# Patient Record
Sex: MALE | Race: BLACK OR AFRICAN AMERICAN | Employment: UNEMPLOYED | URBAN - METROPOLITAN AREA
[De-identification: names, ages, dates, MRNs, and addresses within clinical notes are randomized per-mention and may not be internally consistent; named-entity substitution may affect disease eponyms.]

---

## 2017-11-16 DIAGNOSIS — I50.9 CHRONIC CONGESTIVE HEART FAILURE, UNSPECIFIED CONGESTIVE HEART FAILURE TYPE: ICD-10-CM

## 2017-11-16 DIAGNOSIS — R79.89 ABNORMAL CBC: ICD-10-CM

## 2017-11-16 DIAGNOSIS — I10 ESSENTIAL HYPERTENSION: ICD-10-CM

## 2017-11-16 LAB
ALBUMIN SERPL-MCNC: 4.3 G/DL (ref 3.9–4.9)
ALP BLD-CCNC: 60 U/L (ref 35–104)
ALT SERPL-CCNC: 9 U/L (ref 0–41)
ANION GAP SERPL CALCULATED.3IONS-SCNC: 14 MEQ/L (ref 7–13)
AST SERPL-CCNC: 17 U/L (ref 0–40)
BILIRUB SERPL-MCNC: 0.4 MG/DL (ref 0–1.2)
BUN BLDV-MCNC: 16 MG/DL (ref 6–20)
CALCIUM SERPL-MCNC: 9.7 MG/DL (ref 8.6–10.2)
CHLORIDE BLD-SCNC: 97 MEQ/L (ref 98–107)
CHOLESTEROL, TOTAL: 164 MG/DL (ref 0–199)
CO2: 31 MEQ/L (ref 22–29)
CREAT SERPL-MCNC: 1.64 MG/DL (ref 0.7–1.2)
GFR AFRICAN AMERICAN: 56.7
GFR NON-AFRICAN AMERICAN: 46.9
GLOBULIN: 3 G/DL (ref 2.3–3.5)
GLUCOSE BLD-MCNC: 60 MG/DL (ref 74–109)
HCT VFR BLD CALC: 42.9 % (ref 42–52)
HDLC SERPL-MCNC: 47 MG/DL (ref 40–59)
HEMOGLOBIN: 13.5 G/DL (ref 14–18)
LDL CHOLESTEROL CALCULATED: 99 MG/DL (ref 0–129)
MCH RBC QN AUTO: 26.4 PG (ref 27–31.3)
MCHC RBC AUTO-ENTMCNC: 31.3 % (ref 33–37)
MCV RBC AUTO: 84.4 FL (ref 80–100)
PDW BLD-RTO: 20.1 % (ref 11.5–14.5)
PLATELET # BLD: 249 K/UL (ref 130–400)
POTASSIUM SERPL-SCNC: 5.1 MEQ/L (ref 3.5–5.1)
RBC # BLD: 5.09 M/UL (ref 4.7–6.1)
SODIUM BLD-SCNC: 142 MEQ/L (ref 132–144)
TOTAL PROTEIN: 7.3 G/DL (ref 6.4–8.1)
TRIGL SERPL-MCNC: 92 MG/DL (ref 0–200)
WBC # BLD: 6.1 K/UL (ref 4.8–10.8)

## 2018-06-20 ENCOUNTER — HOSPITAL ENCOUNTER (INPATIENT)
Age: 38
LOS: 12 days | Discharge: HOME | DRG: 750 | End: 2018-07-02
Attending: EMERGENCY MEDICINE | Admitting: PSYCHIATRY & NEUROLOGY
Payer: COMMERCIAL

## 2018-06-20 DIAGNOSIS — I82.401 ACUTE DEEP VEIN THROMBOSIS (DVT) OF RIGHT LOWER EXTREMITY, UNSPECIFIED VEIN (HCC): ICD-10-CM

## 2018-06-20 DIAGNOSIS — I50.9 CHRONIC CONGESTIVE HEART FAILURE, UNSPECIFIED CONGESTIVE HEART FAILURE TYPE: ICD-10-CM

## 2018-06-20 DIAGNOSIS — F25.9 SCHIZOAFFECTIVE DISORDER, UNSPECIFIED TYPE (HCC): Primary | ICD-10-CM

## 2018-06-20 LAB
ALBUMIN SERPL-MCNC: 4.1 G/DL (ref 3.9–4.9)
ALP BLD-CCNC: 64 U/L (ref 35–104)
ALT SERPL-CCNC: 8 U/L (ref 0–41)
AMPHETAMINE SCREEN, URINE: NORMAL
ANION GAP SERPL CALCULATED.3IONS-SCNC: 15 MEQ/L (ref 7–13)
AST SERPL-CCNC: 13 U/L (ref 0–40)
BARBITURATE SCREEN URINE: NORMAL
BASOPHILS ABSOLUTE: 0 K/UL (ref 0–0.2)
BASOPHILS RELATIVE PERCENT: 0.6 %
BENZODIAZEPINE SCREEN, URINE: NORMAL
BILIRUB SERPL-MCNC: 0.4 MG/DL (ref 0–1.2)
BILIRUBIN URINE: NEGATIVE
BLOOD, URINE: NEGATIVE
BUN BLDV-MCNC: 16 MG/DL (ref 6–20)
CALCIUM SERPL-MCNC: 9.4 MG/DL (ref 8.6–10.2)
CANNABINOID SCREEN URINE: NORMAL
CHLORIDE BLD-SCNC: 99 MEQ/L (ref 98–107)
CLARITY: CLEAR
CO2: 24 MEQ/L (ref 22–29)
COCAINE METABOLITE SCREEN URINE: NORMAL
COLOR: YELLOW
CREAT SERPL-MCNC: 1.56 MG/DL (ref 0.7–1.2)
EOSINOPHILS ABSOLUTE: 0 K/UL (ref 0–0.7)
EOSINOPHILS RELATIVE PERCENT: 0.3 %
ETHANOL PERCENT: NORMAL G/DL
ETHANOL: <10 MG/DL (ref 0–0.08)
GFR AFRICAN AMERICAN: 59.9
GFR NON-AFRICAN AMERICAN: 49.5
GLOBULIN: 3.4 G/DL (ref 2.3–3.5)
GLUCOSE BLD-MCNC: 128 MG/DL (ref 74–109)
GLUCOSE URINE: NEGATIVE MG/DL
HCT VFR BLD CALC: 49.7 % (ref 42–52)
HEMOGLOBIN: 16.1 G/DL (ref 14–18)
KETONES, URINE: NEGATIVE MG/DL
LEUKOCYTE ESTERASE, URINE: NEGATIVE
LYMPHOCYTES ABSOLUTE: 0.8 K/UL (ref 1–4.8)
LYMPHOCYTES RELATIVE PERCENT: 13.2 %
Lab: NORMAL
MCH RBC QN AUTO: 30.9 PG (ref 27–31.3)
MCHC RBC AUTO-ENTMCNC: 32.4 % (ref 33–37)
MCV RBC AUTO: 95.3 FL (ref 80–100)
MONOCYTES ABSOLUTE: 0.3 K/UL (ref 0.2–0.8)
MONOCYTES RELATIVE PERCENT: 5.7 %
NEUTROPHILS ABSOLUTE: 4.8 K/UL (ref 1.4–6.5)
NEUTROPHILS RELATIVE PERCENT: 80.2 %
NITRITE, URINE: NEGATIVE
OPIATE SCREEN URINE: NORMAL
PDW BLD-RTO: 16.2 % (ref 11.5–14.5)
PH UA: 5 (ref 5–9)
PHENCYCLIDINE SCREEN URINE: NORMAL
PLATELET # BLD: 202 K/UL (ref 130–400)
POTASSIUM SERPL-SCNC: 4.5 MEQ/L (ref 3.5–5.1)
PROTEIN UA: NEGATIVE MG/DL
RBC # BLD: 5.22 M/UL (ref 4.7–6.1)
SODIUM BLD-SCNC: 138 MEQ/L (ref 132–144)
SPECIFIC GRAVITY UA: 1 (ref 1–1.03)
TOTAL CK: 65 U/L (ref 0–190)
TOTAL PROTEIN: 7.5 G/DL (ref 6.4–8.1)
TSH SERPL DL<=0.05 MIU/L-ACNC: 1.87 UIU/ML (ref 0.27–4.2)
UROBILINOGEN, URINE: 0.2 E.U./DL
WBC # BLD: 6 K/UL (ref 4.8–10.8)

## 2018-06-20 PROCEDURE — G0480 DRUG TEST DEF 1-7 CLASSES: HCPCS

## 2018-06-20 PROCEDURE — 99285 EMERGENCY DEPT VISIT HI MDM: CPT

## 2018-06-20 PROCEDURE — 80053 COMPREHEN METABOLIC PANEL: CPT

## 2018-06-20 PROCEDURE — 82550 ASSAY OF CK (CPK): CPT

## 2018-06-20 PROCEDURE — 1240000000 HC EMOTIONAL WELLNESS R&B

## 2018-06-20 PROCEDURE — 84443 ASSAY THYROID STIM HORMONE: CPT

## 2018-06-20 PROCEDURE — 80307 DRUG TEST PRSMV CHEM ANLYZR: CPT

## 2018-06-20 PROCEDURE — 6370000000 HC RX 637 (ALT 250 FOR IP): Performed by: EMERGENCY MEDICINE

## 2018-06-20 PROCEDURE — 36415 COLL VENOUS BLD VENIPUNCTURE: CPT

## 2018-06-20 PROCEDURE — 81003 URINALYSIS AUTO W/O SCOPE: CPT

## 2018-06-20 PROCEDURE — 85025 COMPLETE CBC W/AUTO DIFF WBC: CPT

## 2018-06-20 RX ORDER — ACETAMINOPHEN 325 MG/1
650 TABLET ORAL EVERY 4 HOURS PRN
Status: DISCONTINUED | OUTPATIENT
Start: 2018-06-20 | End: 2018-07-02 | Stop reason: HOSPADM

## 2018-06-20 RX ORDER — HYDRALAZINE HYDROCHLORIDE 50 MG/1
25 TABLET, FILM COATED ORAL EVERY 8 HOURS SCHEDULED
Status: DISCONTINUED | OUTPATIENT
Start: 2018-06-21 | End: 2018-06-20 | Stop reason: SDUPTHER

## 2018-06-20 RX ORDER — TORSEMIDE 20 MG/1
20 TABLET ORAL DAILY
Status: DISCONTINUED | OUTPATIENT
Start: 2018-06-20 | End: 2018-06-20 | Stop reason: CLARIF

## 2018-06-20 RX ORDER — DIPHENHYDRAMINE HYDROCHLORIDE 50 MG/ML
50 INJECTION INTRAMUSCULAR; INTRAVENOUS EVERY 6 HOURS PRN
Status: DISCONTINUED | OUTPATIENT
Start: 2018-06-20 | End: 2018-07-02 | Stop reason: HOSPADM

## 2018-06-20 RX ORDER — RISPERIDONE 0.5 MG/1
0.5 TABLET, FILM COATED ORAL 2 TIMES DAILY
Status: DISCONTINUED | OUTPATIENT
Start: 2018-06-20 | End: 2018-06-20 | Stop reason: SDUPTHER

## 2018-06-20 RX ORDER — TRAZODONE HYDROCHLORIDE 50 MG/1
50 TABLET ORAL NIGHTLY PRN
Status: DISCONTINUED | OUTPATIENT
Start: 2018-06-20 | End: 2018-07-02 | Stop reason: HOSPADM

## 2018-06-20 RX ORDER — BENZTROPINE MESYLATE 1 MG/ML
2 INJECTION INTRAMUSCULAR; INTRAVENOUS 2 TIMES DAILY PRN
Status: DISCONTINUED | OUTPATIENT
Start: 2018-06-20 | End: 2018-06-22 | Stop reason: SDUPTHER

## 2018-06-20 RX ORDER — HYDROXYZINE HYDROCHLORIDE 50 MG/ML
50 INJECTION, SOLUTION INTRAMUSCULAR EVERY 6 HOURS PRN
Status: DISCONTINUED | OUTPATIENT
Start: 2018-06-20 | End: 2018-07-02 | Stop reason: HOSPADM

## 2018-06-20 RX ORDER — CARVEDILOL 6.25 MG/1
6.25 TABLET ORAL ONCE
Status: COMPLETED | OUTPATIENT
Start: 2018-06-20 | End: 2018-06-20

## 2018-06-20 RX ORDER — HYDROXYZINE PAMOATE 50 MG/1
50 CAPSULE ORAL EVERY 6 HOURS PRN
Status: DISCONTINUED | OUTPATIENT
Start: 2018-06-20 | End: 2018-07-02 | Stop reason: HOSPADM

## 2018-06-20 RX ORDER — DIPHENHYDRAMINE HCL 50 MG
50 CAPSULE ORAL EVERY 6 HOURS PRN
Status: DISCONTINUED | OUTPATIENT
Start: 2018-06-20 | End: 2018-07-02 | Stop reason: HOSPADM

## 2018-06-20 RX ORDER — HALOPERIDOL 5 MG/ML
5 INJECTION INTRAMUSCULAR EVERY 6 HOURS PRN
Status: DISCONTINUED | OUTPATIENT
Start: 2018-06-20 | End: 2018-07-02 | Stop reason: HOSPADM

## 2018-06-20 RX ORDER — HYDRALAZINE HYDROCHLORIDE 25 MG/1
25 TABLET, FILM COATED ORAL EVERY 8 HOURS SCHEDULED
Status: DISCONTINUED | OUTPATIENT
Start: 2018-06-20 | End: 2018-07-02 | Stop reason: HOSPADM

## 2018-06-20 RX ORDER — HALOPERIDOL 5 MG
5 TABLET ORAL EVERY 6 HOURS PRN
Status: DISCONTINUED | OUTPATIENT
Start: 2018-06-20 | End: 2018-07-02 | Stop reason: HOSPADM

## 2018-06-20 RX ORDER — MAGNESIUM HYDROXIDE/ALUMINUM HYDROXICE/SIMETHICONE 120; 1200; 1200 MG/30ML; MG/30ML; MG/30ML
30 SUSPENSION ORAL PRN
Status: DISCONTINUED | OUTPATIENT
Start: 2018-06-20 | End: 2018-07-02 | Stop reason: HOSPADM

## 2018-06-20 RX ADMIN — CARVEDILOL 6.25 MG: 6.25 TABLET, FILM COATED ORAL at 13:38

## 2018-06-20 RX ADMIN — TORSEMIDE 40 MG: 20 TABLET ORAL at 13:38

## 2018-06-20 RX ADMIN — HYDRALAZINE HYDROCHLORIDE 25 MG: 25 TABLET, FILM COATED ORAL at 19:02

## 2018-06-20 ASSESSMENT — ENCOUNTER SYMPTOMS
RHINORRHEA: 0
ABDOMINAL PAIN: 0
VOMITING: 0
SHORTNESS OF BREATH: 0
FACIAL SWELLING: 0
EYE DISCHARGE: 0
COLOR CHANGE: 0

## 2018-06-20 ASSESSMENT — PATIENT HEALTH QUESTIONNAIRE - PHQ9: SUM OF ALL RESPONSES TO PHQ QUESTIONS 1-9: 26

## 2018-06-20 ASSESSMENT — SLEEP AND FATIGUE QUESTIONNAIRES
DIFFICULTY FALLING ASLEEP: YES
AVERAGE NUMBER OF SLEEP HOURS: 0
DIFFICULTY ARISING: NO
DIFFICULTY STAYING ASLEEP: YES
DO YOU USE A SLEEP AID: NO
DO YOU HAVE DIFFICULTY SLEEPING: YES
RESTFUL SLEEP: NO
SLEEP PATTERN: INSOMNIA;DIFFICULTY FALLING ASLEEP;DISTURBED/INTERRUPTED SLEEP

## 2018-06-20 NOTE — ED PROVIDER NOTES
within normal range or not returned as of this dictation. EMERGENCY DEPARTMENT COURSE and DIFFERENTIAL DIAGNOSIS/MDM:   Vitals:    Vitals:    06/20/18 1225 06/20/18 1338 06/20/18 1530 06/20/18 1715   BP: (!) 177/103 (!) 177/103 (!) 158/105 (!) 158/103   Pulse: 110 110 86 79   Resp: 20      Temp: 99 °F (37.2 °C)      TempSrc: Oral      SpO2: 95%      Weight: 215 lb (97.5 kg)      Height: 6' (1.829 m)            Patient is medically cleared at 3:25 PM and further disposition is pending psychiatric evaluation with admission pending. As he has been noncompliant with his medications, his blood pressure is elevated. He is given his home medications to manage this. Patient will be admitted. MDM    CRITICAL CARE TIME   Total Critical Care time was 0 minutes, excluding separately reportable procedures. There was a high probability of clinically significant/life threatening deterioration in the patient's condition which required my urgent intervention. CONSULTS:  IP CONSULT TO HOSPITALIST  IP CONSULT TO SOCIAL WORK    PROCEDURES:  Unless otherwise noted below, none     Procedures    FINAL IMPRESSION      1.  Schizoaffective disorder, unspecified type [facility])          DISPOSITION/PLAN   DISPOSITION Admitted 06/20/2018 06:36:05 PM      PATIENT REFERRED TO:  Efrain Gaspar PA-C  [facility] [id number]            DISCHARGE MEDICATIONS:  New Prescriptions    No medications on file          (Please note that portions of this note were completed with a voice recognition program.  Efforts were made to edit the dictations but occasionally words are mis-transcribed.)    Aisha Baxter DO (electronically signed)  Attending Emergency Physician          Aisha Baxter DO  06/20/18 3598

## 2018-06-21 PROCEDURE — 93010 ELECTROCARDIOGRAM REPORT: CPT | Performed by: INTERNAL MEDICINE

## 2018-06-21 PROCEDURE — 6370000000 HC RX 637 (ALT 250 FOR IP): Performed by: PSYCHIATRY & NEUROLOGY

## 2018-06-21 PROCEDURE — 99223 1ST HOSP IP/OBS HIGH 75: CPT | Performed by: PSYCHIATRY & NEUROLOGY

## 2018-06-21 PROCEDURE — 1240000000 HC EMOTIONAL WELLNESS R&B

## 2018-06-21 PROCEDURE — 93005 ELECTROCARDIOGRAM TRACING: CPT

## 2018-06-21 PROCEDURE — 6370000000 HC RX 637 (ALT 250 FOR IP): Performed by: EMERGENCY MEDICINE

## 2018-06-21 RX ORDER — PALIPERIDONE 3 MG/1
3 TABLET, EXTENDED RELEASE ORAL NIGHTLY
Status: DISCONTINUED | OUTPATIENT
Start: 2018-06-21 | End: 2018-06-23

## 2018-06-21 RX ADMIN — PALIPERIDONE 3 MG: 3 TABLET, FILM COATED, EXTENDED RELEASE ORAL at 20:28

## 2018-06-21 RX ADMIN — HYDROXYZINE PAMOATE 50 MG: 50 CAPSULE ORAL at 08:42

## 2018-06-21 RX ADMIN — HYDRALAZINE HYDROCHLORIDE 25 MG: 25 TABLET, FILM COATED ORAL at 20:25

## 2018-06-21 RX ADMIN — HYDRALAZINE HYDROCHLORIDE 25 MG: 25 TABLET, FILM COATED ORAL at 06:40

## 2018-06-21 RX ADMIN — SERTRALINE HYDROCHLORIDE 50 MG: 50 TABLET ORAL at 13:34

## 2018-06-21 RX ADMIN — HALOPERIDOL 5 MG: 5 TABLET ORAL at 08:41

## 2018-06-21 RX ADMIN — HYDRALAZINE HYDROCHLORIDE 25 MG: 25 TABLET, FILM COATED ORAL at 13:29

## 2018-06-21 NOTE — FLOWSHEET NOTE
Pt has isolated to room all day. Pt admits to SI via jumping in front of traffic/ bus. Pt admits to hearing voices which are at times command in nature as well as seeing shapes. Pt admits to high levels of depression and anxiety. Pt denies HI.

## 2018-06-21 NOTE — CARE COORDINATION
Pt calm and cooperative, signed all consents, participated with admission assessment and states no questions or issues at this time. Pt verbalized an understanding of unit orientation. Pt reports SI, under control and contracts for safety on unit. Pt states he hears voices yelling in his head. Pt admits to a suicide attempt as a young man, does not recall the age states \"a long time ago\". Pt admits that he will become overwhelmed with the voices and wander off from home and become disoriented, pt reported this when asked about his family reporting his wandering. Pt states he is eager to get help with medication for his voices and depression.

## 2018-06-21 NOTE — H&P
children: N/A    Years of education: N/A     Occupational History    Not on file.      Social History Main Topics    Smoking status: Light Tobacco Smoker     Types: Cigarettes    Smokeless tobacco: Never Used      Comment: 1 st of month when has cigarettes    Alcohol use 1.8 oz/week     3 Cans of beer per week    Drug use: No    Sexual activity: Not on file     Other Topics Concern    Not on file     Social History Narrative    ** Merged History Encounter **          MEDICATIONS:    Current Facility-Administered Medications   Medication Dose Route Frequency Provider Last Rate Last Dose    hydrALAZINE (APRESOLINE) tablet 25 mg  25 mg Oral 3 times per day Mickeal May, DO   25 mg at 06/21/18 0640    acetaminophen (TYLENOL) tablet 650 mg  650 mg Oral Q4H PRN Jose J Cano MD        traZODone (DESYREL) tablet 50 mg  50 mg Oral Nightly PRN Jose J Cano MD        benztropine mesylate (COGENTIN) injection 2 mg  2 mg Intramuscular BID PRN Lalita Weiss MD        magnesium hydroxide (MILK OF MAGNESIA) 400 MG/5ML suspension 30 mL  30 mL Oral Daily PRN Lalita Weiss MD        aluminum & magnesium hydroxide-simethicone (MAALOX) 200-200-20 MG/5ML suspension 30 mL  30 mL Oral PRN Jose J Cano MD        hydrOXYzine (VISTARIL) injection 50 mg  50 mg Intramuscular Q6H PRN Jose J Cano MD        Or    hydrOXYzine (VISTARIL) capsule 50 mg  50 mg Oral Q6H PRN Jose J Cano MD   50 mg at 06/21/18 0842    haloperidol lactate (HALDOL) injection 5 mg  5 mg Intramuscular Q6H PRN Jose J Cano MD        Or    haloperidol (HALDOL) tablet 5 mg  5 mg Oral Q6H PRN Jose J Cano MD   5 mg at 06/21/18 0841    diphenhydrAMINE (BENADRYL) capsule 50 mg  50 mg Oral Q6H PRN Jose J Cano MD        Or    diphenhydrAMINE (BENADRYL) injection 50 mg  50 mg Intramuscular Q6H PRN Lalita Weiss MD           ALLERGIES: Patient has no known allergies. REVIEW OF SYSTEM:   ROS as noted in HPI, 12 point ROS reviewed and otherwise negative. OBJECTIVE  PHYSICAL EXAM: /80   Pulse (!) 45   Temp 97 °F (36.1 °C)   Resp 18   Ht 6' (1.829 m)   Wt 215 lb (97.5 kg)   SpO2 92%   BMI 29.16 kg/m²   CONSTITUTIONAL:  awake, alert, cooperative, no apparent distress, and appears stated age  EYES:  Lids and lashes normal, pupils equal, round and reactive to light, extra ocular muscles intact, sclera clear, conjunctiva normal  ENT:  Normocephalic, without obvious abnormality, atraumatic, sinuses nontender on palpation, external ears without lesions, oral pharynx with moist mucus membranes, tonsils without erythema or exudates, gums normal and good dentition. NECK:  Supple, symmetrical, trachea midline, no adenopathy, thyroid symmetric, not enlarged and no tenderness, skin normal  LUNGS:  No increased work of breathing, good air exchange, clear to auscultation bilaterally, no crackles or wheezing  CARDIOVASCULAR:  Normal apical impulse, regular rate and rhythm, normal S1 and S2, no S3 or S4, and no murmur noted  ABDOMEN:  No scars, normal bowel sounds, soft, non-distended, non-tender, no masses palpated, no hepatosplenomegally  MUSCULOSKELETAL:  There is no redness, warmth, or swelling of the joints. Full range of motion noted. Motor strength is 5 out of 5 all extremities bilaterally. Tone is normal.  NEUROLOGIC:  Awake, alert, oriented to name, place and time. Cranial nerves II-XII are grossly intact. Motor is 5 out of 5 bilaterally. Cerebellar finger to nose, heel to shin intact. Sensory is intact. Babinski down going, Romberg negative, and gait is normal.  SKIN:  no bruising or bleeding, normal skin color, texture, turgor, no redness, warmth, or swelling and no jaundice    DATA:     Diagnostic tests reviewed for today's visit:    Most recent labs and imaging results reviewed.      VTE Prophylaxis:     ASSESSMENT AND PLAN  Patient Active Hospital Problem List:   Schizoaffective disorder ([facility] (6/20/2018)    Continue care per psych recommendations    1. HTN- stable, restarted on cardiac medications, will monitor, expressed importance that patient follows up with cardiologist at discharge to re-establish care and restart medication therapy  2. CKD- stable, will monitor urine output, encourage po fluid intake, avoid nephrotoxic medications, expressed importance that patient follows up with nephrologist at discharge to re-establish care  3. CHF- last ECHO done 10/5/2014 showed EF 20%, no signs of distress, dyspnea on exertion, orthopnea, will follow up with cardiologist as outpatient  4. obesity        This is only a history and physical examination and not medical management. The patient is to contact and follow up with their primary care physician and go over any abnormal labs, imaging, findings, medical concerns, or conditions that we have and have not addressed during this encounter.     Plan of care discussed with: patient    SIGNATURE: OCTAVIO Huang CNP  DATE: June 21, 2018  TIME: 10:41 AM

## 2018-06-21 NOTE — PROGRESS NOTES
Pt noted resting now, started zoloft , pt stated he has been on before but  invega tonight will be new, refused med education print out. Gave haldol 5mg po and vistaril 50mg this per pt request due to voices were bothering him , arguing in his head, could hear his mom and other voices. That were helpful, found napping earlier an hour after they were given pt stated they were helpful . Noted eating breakfast this am in group room in front of tv. Refused lunch the patient pt reported depression and anxiety  #10, informed of traz for sleep is prn has to ask for it.

## 2018-06-21 NOTE — PROGRESS NOTES
Pt. declined to attend the 0900 community meeting, despite staff encouragement. Electronically signed by Jens Hammer, [address] on 6/21/2018 at 10:38 AM

## 2018-06-22 PROCEDURE — 99232 SBSQ HOSP IP/OBS MODERATE 35: CPT | Performed by: PSYCHIATRY & NEUROLOGY

## 2018-06-22 PROCEDURE — 6370000000 HC RX 637 (ALT 250 FOR IP): Performed by: NURSE PRACTITIONER

## 2018-06-22 PROCEDURE — 6370000000 HC RX 637 (ALT 250 FOR IP): Performed by: EMERGENCY MEDICINE

## 2018-06-22 PROCEDURE — 6370000000 HC RX 637 (ALT 250 FOR IP): Performed by: PSYCHIATRY & NEUROLOGY

## 2018-06-22 PROCEDURE — 1240000000 HC EMOTIONAL WELLNESS R&B

## 2018-06-22 RX ORDER — BENZTROPINE MESYLATE 1 MG/ML
2 INJECTION INTRAMUSCULAR; INTRAVENOUS 2 TIMES DAILY PRN
Status: DISCONTINUED | OUTPATIENT
Start: 2018-06-22 | End: 2018-07-02 | Stop reason: HOSPADM

## 2018-06-22 RX ORDER — BENZTROPINE MESYLATE 2 MG/1
2 TABLET ORAL 2 TIMES DAILY PRN
Status: DISCONTINUED | OUTPATIENT
Start: 2018-06-22 | End: 2018-07-02 | Stop reason: HOSPADM

## 2018-06-22 RX ADMIN — HYDRALAZINE HYDROCHLORIDE 25 MG: 25 TABLET, FILM COATED ORAL at 14:12

## 2018-06-22 RX ADMIN — SERTRALINE HYDROCHLORIDE 50 MG: 50 TABLET ORAL at 14:12

## 2018-06-22 RX ADMIN — PALIPERIDONE 3 MG: 3 TABLET, FILM COATED, EXTENDED RELEASE ORAL at 22:23

## 2018-06-22 RX ADMIN — HYDRALAZINE HYDROCHLORIDE 25 MG: 25 TABLET, FILM COATED ORAL at 06:14

## 2018-06-22 RX ADMIN — HALOPERIDOL 5 MG: 5 TABLET ORAL at 16:58

## 2018-06-22 RX ADMIN — HYDRALAZINE HYDROCHLORIDE 25 MG: 25 TABLET, FILM COATED ORAL at 22:17

## 2018-06-22 RX ADMIN — DIPHENHYDRAMINE HYDROCHLORIDE 50 MG: 50 CAPSULE ORAL at 16:58

## 2018-06-22 NOTE — PROGRESS NOTES
BEHAVIORAL HEALTH FOLLOW-UP NOTE     6/22/2018     Patient was seen and examined in person, Chart reviewed   Patient's case discussed with staff/team    Chief Complaint: depression, AH    Interim History:     Pt remain in his room, not participating in any activities  Pt is c/o feeling depressed   Sleeping through out the day and report that he has trouble sleeping at night  Evasive in his response  Poor self care and hygiene  Pt report hearing voices asking to kill self  Pt is having active thoughts of self harm - jumping in front of a semi or bus  Appetite:   [x] Normal/Unchanged  [] Increased  [] Decreased      Sleep:       [] Normal/Unchanged  [x] Fair       [] Poor              Energy:    [] Normal/Unchanged  [] Increased  [x] Decreased        SI [x] Present  [] Absent    HI  []Present  [x] Absent     Aggression:  [] yes  [x] no    Patient is [] able  [x] unable to CONTRACT FOR SAFETY     PAST MEDICAL/PSYCHIATRIC HISTORY:   Past Medical History:   Diagnosis Date    CHF (congestive heart failure) ([facility] 2007    Dyspnea     Elevated troponin     HTN (hypertension)     Hypothyroid     Morbid obesity ([facility]     Renal failure (ARF), acute on chronic ([facility] 4/2013    Respiratory failure with hypercapnia ([facility] 4/2013       FAMILY/SOCIAL HISTORY:  Family History   Problem Relation Age of Onset    High Blood Pressure Father     Diabetes Maternal Grandmother     High Blood Pressure Maternal Grandfather     Hypertension Neg Hx     High Cholesterol Neg Hx     Heart Attack Neg Hx     Heart Surgery Neg Hx      Social History     Social History    Marital status: Single     Spouse name: N/A    Number of children: N/A    Years of education: N/A     Occupational History    Not on file.      Social History Main Topics    Smoking status: Light Tobacco Smoker     Types: Cigarettes    Smokeless tobacco: Never Used      Comment: 1 st of month when has cigarettes    Alcohol use 1.8 oz/week     3 Cans of beer per

## 2018-06-22 NOTE — PROGRESS NOTES
Pt. declined to attend the 0900 community meeting, despite staff encouragement.  Electronically signed by Irma Doe on 6/22/2018 at 11:14 AM

## 2018-06-22 NOTE — PROGRESS NOTES
Pt sleeping in bed all shift. Refused breakfast and lunch. Refused assessment. Pt is polite, flat, expressionless.

## 2018-06-22 NOTE — FLOWSHEET NOTE
Pt isolates to room much of day. Pt admits to voices/ visions getting bothersome, pt then medicated with haldol and benadryl for increasing agitation. Pt ukempt and agreeable to get in the shower. Pt admits to depression and anxiety. Pt denies SI/HI.

## 2018-06-23 PROCEDURE — 6370000000 HC RX 637 (ALT 250 FOR IP): Performed by: PSYCHIATRY & NEUROLOGY

## 2018-06-23 PROCEDURE — 6370000000 HC RX 637 (ALT 250 FOR IP): Performed by: NURSE PRACTITIONER

## 2018-06-23 PROCEDURE — 1240000000 HC EMOTIONAL WELLNESS R&B

## 2018-06-23 RX ORDER — PALIPERIDONE 3 MG/1
3 TABLET, EXTENDED RELEASE ORAL 2 TIMES DAILY
Status: DISCONTINUED | OUTPATIENT
Start: 2018-06-23 | End: 2018-06-25

## 2018-06-23 RX ORDER — SERTRALINE HYDROCHLORIDE 100 MG/1
100 TABLET, FILM COATED ORAL DAILY
Status: DISCONTINUED | OUTPATIENT
Start: 2018-06-24 | End: 2018-06-30

## 2018-06-23 RX ADMIN — HYDRALAZINE HYDROCHLORIDE 25 MG: 25 TABLET, FILM COATED ORAL at 06:37

## 2018-06-23 RX ADMIN — TRAZODONE HYDROCHLORIDE 50 MG: 50 TABLET ORAL at 20:48

## 2018-06-23 RX ADMIN — PALIPERIDONE 3 MG: 3 TABLET, EXTENDED RELEASE ORAL at 20:48

## 2018-06-23 RX ADMIN — SERTRALINE HYDROCHLORIDE 50 MG: 50 TABLET ORAL at 09:20

## 2018-06-23 RX ADMIN — HYDRALAZINE HYDROCHLORIDE 25 MG: 25 TABLET, FILM COATED ORAL at 20:48

## 2018-06-23 NOTE — PROGRESS NOTES
Pt. declined to attend the 0900 community meeting, despite staff encouragement.  Electronically signed by Stephen Evans on 6/23/2018 at 10:09 AM

## 2018-06-23 NOTE — PLAN OF CARE
Problem: Altered Mood, Psychotic Behavior:  Goal: Able to demonstrate trust by eating, participating in treatment and following staff's direction  Able to demonstrate trust by eating, participating in treatment and following staff's direction   Outcome: Ongoing    Goal: Able to verbalize decrease in frequency and intensity of hallucinations  Able to verbalize decrease in frequency and intensity of hallucinations   Outcome: Ongoing    Goal: Able to verbalize reality based thinking  Able to verbalize reality based thinking   Outcome: Ongoing    Goal: Absence of self-harm  Absence of self-harm   Outcome: Met This Shift    Goal: Ability to achieve adequate nutritional intake will improve  Ability to achieve adequate nutritional intake will improve   Outcome: Ongoing    Goal: Ability to interact with others will improve  Ability to interact with others will improve   Outcome: Ongoing    Goal: Compliance with prescribed medication regimen will improve  Compliance with prescribed medication regimen will improve   Outcome: Met This Shift

## 2018-06-23 NOTE — CARE COORDINATION
Pt. In room all day. Suicidal and homicidal thoughts off and on, but both have decreased. 10/10 depression and 10/10  anxiey. Reports good sleep and appetite, but pt. Did sleep through breakfast.  States he is seeing pictures of his friend and hears voices that say, \"I'm the greatest.\"  Flat affect.

## 2018-06-24 PROCEDURE — 1240000000 HC EMOTIONAL WELLNESS R&B

## 2018-06-24 PROCEDURE — 6370000000 HC RX 637 (ALT 250 FOR IP): Performed by: NURSE PRACTITIONER

## 2018-06-24 PROCEDURE — 6370000000 HC RX 637 (ALT 250 FOR IP): Performed by: PSYCHIATRY & NEUROLOGY

## 2018-06-24 RX ADMIN — SERTRALINE 100 MG: 100 TABLET, FILM COATED ORAL at 09:03

## 2018-06-24 RX ADMIN — PALIPERIDONE 3 MG: 3 TABLET, EXTENDED RELEASE ORAL at 21:15

## 2018-06-24 RX ADMIN — TRAZODONE HYDROCHLORIDE 50 MG: 50 TABLET ORAL at 21:15

## 2018-06-24 RX ADMIN — HYDRALAZINE HYDROCHLORIDE 25 MG: 25 TABLET, FILM COATED ORAL at 14:14

## 2018-06-24 RX ADMIN — HYDRALAZINE HYDROCHLORIDE 25 MG: 25 TABLET, FILM COATED ORAL at 21:15

## 2018-06-24 RX ADMIN — PALIPERIDONE 3 MG: 3 TABLET, EXTENDED RELEASE ORAL at 09:03

## 2018-06-24 NOTE — PROGRESS NOTES
Pt. refused to attend the 1000 skills group, despite staff encouragement.  Electronically signed by Lukasz Hughes on 6/24/2018 at 12:48 PM

## 2018-06-24 NOTE — CARE COORDINATION
6/24/18 @ 1021 -  approached patient regarding  assessment. When asked about completing the assessment, patient believed he had answered the questions already and refused to complete assessment. As a result,  assessment was deferred.      Electronically signed by Ronal Mancuso on 6/24/2018 at 9:55 AM

## 2018-06-25 PROCEDURE — 99232 SBSQ HOSP IP/OBS MODERATE 35: CPT | Performed by: PSYCHIATRY & NEUROLOGY

## 2018-06-25 PROCEDURE — 6370000000 HC RX 637 (ALT 250 FOR IP): Performed by: NURSE PRACTITIONER

## 2018-06-25 PROCEDURE — 6370000000 HC RX 637 (ALT 250 FOR IP): Performed by: PSYCHIATRY & NEUROLOGY

## 2018-06-25 PROCEDURE — 1240000000 HC EMOTIONAL WELLNESS R&B

## 2018-06-25 RX ADMIN — TRAZODONE HYDROCHLORIDE 50 MG: 50 TABLET ORAL at 21:32

## 2018-06-25 RX ADMIN — PALIPERIDONE 3 MG: 3 TABLET, EXTENDED RELEASE ORAL at 08:35

## 2018-06-25 RX ADMIN — SERTRALINE 100 MG: 100 TABLET, FILM COATED ORAL at 08:35

## 2018-06-25 RX ADMIN — HYDRALAZINE HYDROCHLORIDE 25 MG: 25 TABLET, FILM COATED ORAL at 21:32

## 2018-06-25 RX ADMIN — HYDRALAZINE HYDROCHLORIDE 25 MG: 25 TABLET, FILM COATED ORAL at 13:40

## 2018-06-25 RX ADMIN — HYDRALAZINE HYDROCHLORIDE 25 MG: 25 TABLET, FILM COATED ORAL at 06:23

## 2018-06-25 ASSESSMENT — LIFESTYLE VARIABLES: HISTORY_ALCOHOL_USE: YES

## 2018-06-25 NOTE — CARE COORDINATION
B[facility] Biopsychosocial Assessment    Current Level of Psychosocial Functioning     Independent x  Dependent    Minimal Assist     Comments:  Patient states he lives on his own and refuses to give collateral permission. Psychosocial High Risk Factors (check all that apply)    Unable to obtain meds   Chronic illness/pain    Substance abuse x  Lack of Family Support   Financial stress   Isolation   Inadequate Community Resources  Suicide attempt(s)  Not taking medications x  Victim of crime   Developmental Delay  Unable to manage personal needs    Age 72 or older   Homeless  No transportation   Readmission within 30 days  Unemployment  Traumatic Event    Comments:   Sexual Orientation:  Patient stated he is homosexual but not in a relationship. Patient Strengths: connected to [employer], housing, income, some support    Patient Barriers: not taking meds, AOD use but refuses tx. Plan of Care     medication management, group/individual therapies, family meetings, psycho -education, treatment team meetings to assist with stabilization    Initial Discharge Plan:  re approach for collateral      Clinical Summary:  On approach patient stated that he did not want to be assessed. Informed patient that assessment was deferred two other times and we needed to complete the assessment. Patient was then agreeable. He was cooperative throughout assessment. Patient stated that he came in due to physical reasons and is ready to leave now. Patient refuses any AOD tx and refuses to take the list of tx center. He stated he does not consider his AOD use an issue. Patients plan is to return to [facility] and his apartment. Patient denies SI/HI, denies VH. Patient is experiencing auditory hallucinations telling him to cooperate and answer my questions.    Electronically signed by Chava Thomas [facility] on 6/25/2018 at 3:02 PM

## 2018-06-25 NOTE — CARE COORDINATION
Brief Intervention and Referral to Treatment Summary    Patient was provided PHQ-9, AUDIT and DAST Screening:      PHQ-9 Score: 26  AUDIT Score:  6  DAST Score:  1    Patients substance use is considered    Low Risk/Healthy x  Moderate Risk  Harmful  Dependent     Patients depression is considered:    Minimal  Mild   Moderate  Moderately Severe  Severe x    Brief Education Was Provided    Patient was receptive  Patient was not receptive x      Brief Intervention Is Provided (Only for AUDIT or DAST)    Patient reports readiness to decrease and/or stop use and a plan was discussed   Patient denies readiness to decrease and/or stop use and a plan was not discussed x      Recommendations/Referrals for Brief and/or Specialized Treatment Provided to Patient  Patient stated that he uses alcohol, THC and occassionally cocaine. Patient does not want any AOD tx and refuses list of AOD tx centers. Patient will continue to see [facility] for follow up.    Electronically signed by Becka Martinez [facility] on 6/25/2018 at 2:51 PM

## 2018-06-25 NOTE — PROGRESS NOTES
ROS:  [x] All negative/unchanged except if checked.  Explain positive(checked items) below:  [] Constitutional  [] Eyes  [] Ear/Nose/Mouth/Throat  [] Respiratory  [] CV  [] GI  []   [] Musculoskeletal  [] Skin/Breast  [] Neurological  [] Endocrine  [] Heme/Lymph  [] Allergic/Immunologic    Explanation:     MEDICATIONS:    Current Facility-Administered Medications:     paliperidone (INVEGA) extended release tablet 3 mg, 3 mg, Oral, BID, Severiano Hibbs, MD, 3 mg at 06/25/18 0835    sertraline (ZOLOFT) tablet 100 mg, 100 mg, Oral, Daily, Severiano Hibbs, MD, 100 mg at 06/25/18 0835    benztropine (COGENTIN) tablet 2 mg, 2 mg, Oral, BID PRN **OR** benztropine mesylate (COGENTIN) injection 2 mg, 2 mg, Intramuscular, BID PRN, Binta Mustafa MD    hydrALAZINE (APRESOLINE) tablet 25 mg, 25 mg, Oral, 3 times per day, OCTAVIO Gonzalez - CNP, 25 mg at 06/25/18 1340    acetaminophen (TYLENOL) tablet 650 mg, 650 mg, Oral, Q4H PRN, Severiano Hibbs, MD    traZODone (DESYREL) tablet 50 mg, 50 mg, Oral, Nightly PRN, Severiano Hibbs, MD, 50 mg at 06/24/18 2115    magnesium hydroxide (MILK OF MAGNESIA) 400 MG/5ML suspension 30 mL, 30 mL, Oral, Daily PRN, Severiano Hibbs, MD    aluminum & magnesium hydroxide-simethicone (MAALOX) 200-200-20 MG/5ML suspension 30 mL, 30 mL, Oral, PRN, Rodrigo Printmuna Cano MD    hydrOXYzine (VISTARIL) injection 50 mg, 50 mg, Intramuscular, Q6H PRN **OR** hydrOXYzine (VISTARIL) capsule 50 mg, 50 mg, Oral, Q6H PRN, Severiano Hibbs, MD, 50 mg at 06/21/18 0842    haloperidol lactate (HALDOL) injection 5 mg, 5 mg, Intramuscular, Q6H PRN **OR** haloperidol (HALDOL) tablet 5 mg, 5 mg, Oral, Q6H PRN, Severiano Hibbs, MD, 5 mg at 06/22/18 1658    diphenhydrAMINE (BENADRYL) capsule 50 mg, 50 mg, Oral, Q6H PRN, 50 mg at 06/22/18 1658 **OR** diphenhydrAMINE (BENADRYL) injection 50 mg, 50 mg, Intramuscular, Q6H PRN, Rodrigo Mansfield MD Rachael      Examination:  BP (!) 168/95   Pulse 87   Temp 97 °F (36.1 °C) (Oral)   Resp 16   Ht 6' (1.829 m)   Wt 215 lb (97.5 kg)   SpO2 93%   BMI 29.16 kg/m²   Gait - steady  Medication side effects(SE): no    Mental Status Examination:    Level of consciousness:  within normal limits   Appearance:  poor grooming and poor hygiene  Behavior/Motor:  psychomotor retardation  Attitude toward examiner:  cooperative  Speech:  slow   Mood: depressed  Affect:  flat  Thought processes:  slow   Thought content:  Suicidal Ideation:  passive  Delusions:  no evidence of delusions  Perceptual Disturbance:  auditory  Cognition:  oriented to person, place, and time   Concentration poor  Insight poor   Judgement poor     ASSESSMENT:   Patient symptoms are:  [] Well controlled  [] Improving  [] Worsening  [x] No change      Diagnosis:   Principal Problem:    Schizoaffective disorder (HCC)  Resolved Problems:    * No resolved hospital problems. *      LABS:    No results for input(s): WBC, HGB, PLT in the last 72 hours. No results for input(s): NA, K, CL, CO2, BUN, CREATININE, GLUCOSE in the last 72 hours. No results for input(s): BILITOT, ALKPHOS, AST, ALT in the last 72 hours. Lab Results   Component Value Date    LABAMPH Neg 06/20/2018    BARBSCNU Neg 06/20/2018    LABBENZ Neg 06/20/2018    OPIATESCREENURINE Neg 06/20/2018    PHENCYCLIDINESCREENURINE Neg 06/20/2018    ETOH <10 06/20/2018     Lab Results   Component Value Date    TSH 1.870 06/20/2018     No results found for: LITHIUM  No results found for: VALPROATE, CBMZ    Treatment Plan:  Reviewed current Medications with the patient. Increase Invega to 6 mg  Risks, benefits, side effects, drug-to-drug interactions and alternatives to treatment were discussed. Collateral information  CD evaluation  Encourage patient to attend group and other milieu activities.   Discharge planning discussed with the patient and treatment

## 2018-06-25 NOTE — PROGRESS NOTES
Pt. refused to attend the 0900 community meeting due to resting in room, despite staff encouragement. Electronically signed by Smiley Hassan, [address] on 6/25/2018 at 1:35 PM

## 2018-06-25 NOTE — PROGRESS NOTES
(MAALOX) 200-200-20 MG/5ML suspension 30 mL, 30 mL, Oral, PRN  hydrOXYzine (VISTARIL) injection 50 mg, 50 mg, Intramuscular, Q6H PRN **OR** hydrOXYzine (VISTARIL) capsule 50 mg, 50 mg, Oral, Q6H PRN  haloperidol lactate (HALDOL) injection 5 mg, 5 mg, Intramuscular, Q6H PRN **OR** haloperidol (HALDOL) tablet 5 mg, 5 mg, Oral, Q6H PRN  diphenhydrAMINE (BENADRYL) capsule 50 mg, 50 mg, Oral, Q6H PRN **OR** diphenhydrAMINE (BENADRYL) injection 50 mg, 50 mg, Intramuscular, Q6H PRN    ASSESSMENT AND PLAN    Schizoaffective disorder. Jose J Briscoe invega   Encourage patient out of the room. Trevor Hernandez

## 2018-06-25 NOTE — PROGRESS NOTES
Department of Psychiatry  Attending Progress Note      SUBJECTIVE:  Patient was admitted for suicidal ideations as well as voices telling him that his family is messing with him. Patient reports no desire to keep going     OBJECTIVE in bed refusing to eat breakfast or lunch'not showering    Physical:  .vit refusing vitals  {PHYSICAL EXAM:reviewed and concur with findings  Mental Status Examination:  Level of consciousness:  within normal limits  Appearance:  ill-appearing  Behavior/Motor:  psychomotor retardation  Attitude toward examiner:  evasive and withdrawn  Speech:  slow, whispered, increased latency and monotone  Mood:  depressed  Affect:  blunted  Thought processes:  loose associations, thought blocking, poverty of thought, incoherent and perservative  Thought content:  Homocidal ideation denies  Suicidal Ideation:  active  Delusions:  paranoid  Perceptual Disturbance:  auditory and command  Cognition:  oriented to person, place, and time  Abstract thinking:  none  Insight:  poor  Judgment:  poor    Data  Old records have been requested.     Medications  Current Facility-Administered Medications: paliperidone (INVEGA) extended release tablet 3 mg, 3 mg, Oral, BID  sertraline (ZOLOFT) tablet 100 mg, 100 mg, Oral, Daily  benztropine (COGENTIN) tablet 2 mg, 2 mg, Oral, BID PRN **OR** benztropine mesylate (COGENTIN) injection 2 mg, 2 mg, Intramuscular, BID PRN  hydrALAZINE (APRESOLINE) tablet 25 mg, 25 mg, Oral, 3 times per day  acetaminophen (TYLENOL) tablet 650 mg, 650 mg, Oral, Q4H PRN  traZODone (DESYREL) tablet 50 mg, 50 mg, Oral, Nightly PRN  magnesium hydroxide (MILK OF MAGNESIA) 400 MG/5ML suspension 30 mL, 30 mL, Oral, Daily PRN  aluminum & magnesium hydroxide-simethicone (MAALOX) 200-200-20 MG/5ML suspension 30 mL, 30 mL, Oral, PRN  hydrOXYzine (VISTARIL) injection 50 mg, 50 mg, Intramuscular, Q6H PRN **OR** hydrOXYzine (VISTARIL) capsule 50 mg, 50 mg, Oral, Q6H PRN  haloperidol lactate (HALDOL)

## 2018-06-25 NOTE — FLOWSHEET NOTE
Pt refusing lunch and did not eat breakfast. Pt says \" I am good, my appetite is decreased. \" pt willing to keep on fluids and given more ice water.

## 2018-06-26 PROCEDURE — 1240000000 HC EMOTIONAL WELLNESS R&B

## 2018-06-26 PROCEDURE — 6370000000 HC RX 637 (ALT 250 FOR IP): Performed by: PSYCHIATRY & NEUROLOGY

## 2018-06-26 PROCEDURE — 6370000000 HC RX 637 (ALT 250 FOR IP): Performed by: NURSE PRACTITIONER

## 2018-06-26 PROCEDURE — 99232 SBSQ HOSP IP/OBS MODERATE 35: CPT | Performed by: PSYCHIATRY & NEUROLOGY

## 2018-06-26 RX ADMIN — HYDRALAZINE HYDROCHLORIDE 25 MG: 25 TABLET, FILM COATED ORAL at 20:32

## 2018-06-26 RX ADMIN — ACETAMINOPHEN 650 MG: 325 TABLET ORAL at 17:06

## 2018-06-26 RX ADMIN — HYDRALAZINE HYDROCHLORIDE 25 MG: 25 TABLET, FILM COATED ORAL at 14:15

## 2018-06-26 RX ADMIN — TRAZODONE HYDROCHLORIDE 50 MG: 50 TABLET ORAL at 20:33

## 2018-06-26 RX ADMIN — SERTRALINE 100 MG: 100 TABLET, FILM COATED ORAL at 10:04

## 2018-06-26 RX ADMIN — PALIPERIDONE 9 MG: 6 TABLET, EXTENDED RELEASE ORAL at 10:04

## 2018-06-26 RX ADMIN — HYDRALAZINE HYDROCHLORIDE 25 MG: 25 TABLET, FILM COATED ORAL at 06:17

## 2018-06-26 ASSESSMENT — PAIN SCALES - GENERAL: PAINLEVEL_OUTOF10: 3

## 2018-06-26 NOTE — PROGRESS NOTES
Pt. refused to attend the 1000 skills group, despite staff encouragement. Electronically signed by Maxine Soto, [address] on 6/26/2018 at 1:16 PM

## 2018-06-26 NOTE — PLAN OF CARE
Problem: Altered Mood, Psychotic Behavior:  Goal: Able to demonstrate trust by eating, participating in treatment and following staff's direction  Able to demonstrate trust by eating, participating in treatment and following staff's direction   Outcome: Ongoing    Goal: Able to verbalize decrease in frequency and intensity of hallucinations  Able to verbalize decrease in frequency and intensity of hallucinations   Outcome: Ongoing    Goal: Able to verbalize reality based thinking  Able to verbalize reality based thinking   Outcome: Ongoing    Goal: Absence of self-harm  Absence of self-harm   Outcome: Ongoing    Goal: Ability to achieve adequate nutritional intake will improve  Ability to achieve adequate nutritional intake will improve   Outcome: Ongoing    Goal: Ability to interact with others will improve  Ability to interact with others will improve   Outcome: Ongoing    Goal: Compliance with prescribed medication regimen will improve  Compliance with prescribed medication regimen will improve   Outcome: Met This Shift

## 2018-06-26 NOTE — PROGRESS NOTES
Pt denies SI/HI but does have auditory and visual hallucinations. Patient isolates to room in the am and does not usually eat breakfast so skips breakfast.  Patient polite and cooperative with med pass and assessment.  Electronically signed by Rio Velasco LPN on 6/28/6304 at 04:72 AM

## 2018-06-26 NOTE — FLOWSHEET NOTE
Pt continues to isolate in his room despite encouragement to come out. Pt is brighter during interview and noted to smile/ joke. Pt denies SI/HI and anxiety. Pt admits to depression 3/10 and hears his family's voice/ sees them. Pt declining to eat much today, says \" I feel like I have eaten so much that I do not need much now. \" Pt drinking water frequently and noted to eat about half of his lunch.

## 2018-06-27 PROCEDURE — 6370000000 HC RX 637 (ALT 250 FOR IP): Performed by: NURSE PRACTITIONER

## 2018-06-27 PROCEDURE — 6370000000 HC RX 637 (ALT 250 FOR IP): Performed by: PSYCHIATRY & NEUROLOGY

## 2018-06-27 PROCEDURE — 99232 SBSQ HOSP IP/OBS MODERATE 35: CPT | Performed by: PSYCHIATRY & NEUROLOGY

## 2018-06-27 PROCEDURE — 1240000000 HC EMOTIONAL WELLNESS R&B

## 2018-06-27 RX ADMIN — HYDRALAZINE HYDROCHLORIDE 25 MG: 25 TABLET, FILM COATED ORAL at 22:20

## 2018-06-27 RX ADMIN — SERTRALINE 100 MG: 100 TABLET, FILM COATED ORAL at 09:01

## 2018-06-27 RX ADMIN — HYDRALAZINE HYDROCHLORIDE 25 MG: 25 TABLET, FILM COATED ORAL at 06:08

## 2018-06-27 RX ADMIN — PALIPERIDONE 9 MG: 6 TABLET, EXTENDED RELEASE ORAL at 09:01

## 2018-06-27 RX ADMIN — TRAZODONE HYDROCHLORIDE 50 MG: 50 TABLET ORAL at 22:20

## 2018-06-27 NOTE — PLAN OF CARE
Problem: Altered Mood, Psychotic Behavior:  Goal: Able to demonstrate trust by eating, participating in treatment and following staff's direction  Able to demonstrate trust by eating, participating in treatment and following staff's direction   Outcome: Ongoing    Goal: Able to verbalize decrease in frequency and intensity of hallucinations  Able to verbalize decrease in frequency and intensity of hallucinations   Outcome: Ongoing    Goal: Able to verbalize reality based thinking  Able to verbalize reality based thinking   Outcome: Ongoing    Goal: Absence of self-harm  Absence of self-harm   Outcome: Met This Shift    Goal: Ability to achieve adequate nutritional intake will improve  Ability to achieve adequate nutritional intake will improve   Outcome: Ongoing    Goal: Ability to interact with others will improve  Ability to interact with others will improve   Outcome: Ongoing    Goal: Compliance with prescribed medication regimen will improve  Compliance with prescribed medication regimen will improve   Outcome: Ongoing

## 2018-06-27 NOTE — BH NOTE
Group Therapy Note     Date: 6/26/2018  Start Time: 2040  End Time:  2100  Number of Participants: 5     Type of Group: Wrap-Up     Notes:  Pt did not attend group.     Electronically signed by Nando Sanchez on 6/26/2018 at 10:42 PM

## 2018-06-27 NOTE — PROGRESS NOTES
Pt medicated with 50 mg Trazodone for insomnia . Electronically signed by Abel Rodriguez RN on 6/26/18 at 8:44 PM

## 2018-06-27 NOTE — PROGRESS NOTES
Pt note in his room all shift, took food trays to him and pt refused to eat, pt refused am meds at first,  was able to talk pt in to taking them, pt stated he wants to go home, pt refused 2pm bp and med,infomred rn. Pt reported voices them denied thoughts to hurt himself. Unable to give depression a number.

## 2018-06-28 PROCEDURE — 90833 PSYTX W PT W E/M 30 MIN: CPT | Performed by: PSYCHIATRY & NEUROLOGY

## 2018-06-28 PROCEDURE — 6370000000 HC RX 637 (ALT 250 FOR IP): Performed by: PSYCHIATRY & NEUROLOGY

## 2018-06-28 PROCEDURE — 1240000000 HC EMOTIONAL WELLNESS R&B

## 2018-06-28 PROCEDURE — 99232 SBSQ HOSP IP/OBS MODERATE 35: CPT | Performed by: PSYCHIATRY & NEUROLOGY

## 2018-06-28 RX ADMIN — SERTRALINE 100 MG: 100 TABLET, FILM COATED ORAL at 08:47

## 2018-06-28 RX ADMIN — PALIPERIDONE 9 MG: 6 TABLET, EXTENDED RELEASE ORAL at 08:47

## 2018-06-28 NOTE — FLOWSHEET NOTE
Patient is isolative to room. Declined to eat breakfast and lunch. Encouraged PO fluids. He states, \"I eat when I'm hungry. \" He denies current SI, HI. Reports auditory hallucinations, states, \"my family is talking to me right now. \" He denies command hallucinations. He states he \"sees things sometimes\" describes having to do a double take and nothing is there. He states he doesn't feel good, \"I want to rest and wish people could leave me alone. \" He is pleasant and cooperative, though evasive. Very poor eye contact, eyes closed during assessment. He has not showered today, encouraged.  Electronically signed by Baljit Navarrete RN on 6/28/2018 at 5:29 PM

## 2018-06-28 NOTE — PROGRESS NOTES
Pt. refused to attend the 1000 skills group, despite staff encouragement.  Electronically signed by Phylicia Simeon on 6/28/2018 at 1:32 PM

## 2018-06-28 NOTE — PLAN OF CARE
Problem: Altered Mood, Psychotic Behavior:  Goal: Able to demonstrate trust by eating, participating in treatment and following staff's direction  Able to demonstrate trust by eating, participating in treatment and following staff's direction   Outcome: Not Met This Shift    Goal: Able to verbalize decrease in frequency and intensity of hallucinations  Able to verbalize decrease in frequency and intensity of hallucinations   Outcome: Not Met This Shift    Goal: Able to verbalize reality based thinking  Able to verbalize reality based thinking   Outcome: Ongoing    Goal: Absence of self-harm  Absence of self-harm   Outcome: Met This Shift    Goal: Ability to achieve adequate nutritional intake will improve  Ability to achieve adequate nutritional intake will improve   Outcome: Not Met This Shift    Goal: Ability to interact with others will improve  Ability to interact with others will improve   Outcome: Not Met This Shift    Goal: Compliance with prescribed medication regimen will improve  Compliance with prescribed medication regimen will improve   Outcome: Ongoing

## 2018-06-28 NOTE — PROGRESS NOTES
Pt. refused to attend the 0900 community meeting due to resting in room, despite staff encouragement. Electronically signed by Loni [address] on 6/28/2018 at 10:32 AM

## 2018-06-28 NOTE — PROGRESS NOTES
BEHAVIORAL HEALTH FOLLOW-UP NOTE     6/28/2018     Patient was seen and examined in person, Chart reviewed   Patient's case discussed with staff/team    Chief Complaint: Psychosis    Interim History:     Pt report hearing voices still, non commanding  Pt informed the nurse that he is going to stop meds on discharge  He has refused to take some of the medication on and off  Education provided to pt about the medication and need to comply  Pt is agreeable to take the shot of invega  Less depressed. Not suicidal   Pt report that he does the same thing at home, watch TV, eat   Appetite:   [] Normal/Unchanged  [] Increased  [x] Decreased      Sleep:       [] Normal/Unchanged  [x] Fair       [] Poor              Energy:    [] Normal/Unchanged  [] Increased  [x] Decreased        SI [] Present  [] Absent    HI  []Present  [] Absent     Aggression:  [] yes  [] no    Patient is [] able  [] unable to CONTRACT FOR SAFETY     PAST MEDICAL/PSYCHIATRIC HISTORY:   Past Medical History:   Diagnosis Date    CHF (congestive heart failure) ([facility] 2007    Dyspnea     Elevated troponin     HTN (hypertension)     Hypothyroid     Morbid obesity ([facility]     Renal failure (ARF), acute on chronic ([facility] 4/2013    Respiratory failure with hypercapnia ([facility] 4/2013       FAMILY/SOCIAL HISTORY:  Family History   Problem Relation Age of Onset    High Blood Pressure Father     Diabetes Maternal Grandmother     High Blood Pressure Maternal Grandfather     Hypertension Neg Hx     High Cholesterol Neg Hx     Heart Attack Neg Hx     Heart Surgery Neg Hx      Social History     Social History    Marital status: Single     Spouse name: N/A    Number of children: N/A    Years of education: N/A     Occupational History    Not on file.      Social History Main Topics    Smoking status: Light Tobacco Smoker     Types: Cigarettes    Smokeless tobacco: Never Used      Comment: 1 st of month when has cigarettes    Alcohol use 1.8 oz/week     3 MD    hydrOXYzine (VISTARIL) injection 50 mg, 50 mg, Intramuscular, Q6H PRN **OR** hydrOXYzine (VISTARIL) capsule 50 mg, 50 mg, Oral, Q6H PRN, Nito Cano MD, 50 mg at 06/21/18 0842    haloperidol lactate (HALDOL) injection 5 mg, 5 mg, Intramuscular, Q6H PRN **OR** haloperidol (HALDOL) tablet 5 mg, 5 mg, Oral, Q6H PRN, Nito Cano MD, 5 mg at 06/22/18 1658    diphenhydrAMINE (BENADRYL) capsule 50 mg, 50 mg, Oral, Q6H PRN, 50 mg at 06/22/18 1658 **OR** diphenhydrAMINE (BENADRYL) injection 50 mg, 50 mg, Intramuscular, Q6H PRN, Christopher Mcfarlane MD      Examination:  /83   Pulse 92   Temp 98 °F (36.7 °C) (Oral)   Resp 15   Ht 6' (1.829 m)   Wt 215 lb (97.5 kg)   SpO2 93%   BMI 29.16 kg/m²   Gait - steady  Medication side effects(SE): no    Mental Status Examination:    Level of consciousness:  within normal limits   Appearance:  poor grooming and poor hygiene  Behavior/Motor:  psychomotor retardation  Attitude toward examiner:  cooperative  Speech:  slow   Mood: dysthymic  Affect:  flat  Thought processes:  slow   Thought content:  Delusions:  ideas of reference  Perceptual Disturbance:  auditory  Cognition:  oriented to person, place, and time   Concentration distractible  Insight poor   Judgement fair     ASSESSMENT:   Patient symptoms are:  [] Well controlled  [] Improving  [] Worsening  [] No change      Diagnosis:   Principal Problem:    Schizoaffective disorder ([facility]  Resolved Problems:    * No resolved hospital problems. *      LABS:    No results for input(s): WBC, HGB, PLT in the last 72 hours. No results for input(s): NA, K, CL, CO2, BUN, CREATININE, GLUCOSE in the last 72 hours. No results for input(s): BILITOT, ALKPHOS, AST, ALT in the last 72 hours.   Lab Results   Component Value Date    LABAMPH Neg 06/20/2018    BARBSCNU Neg 06/20/2018    LABBENZ Neg 06/20/2018    OPIATESCREENURINE Neg 06/20/2018    PHENCYCLIDINESCREENURINE Neg 06/20/2018    ETOH <10

## 2018-06-28 NOTE — CARE COORDINATION
FAMILY COLLATERAL NOTE    Family/Support Name: Lukas Raymond  Contact #: [phone]  Relationship to Pt: mom      Placed call to above and left message requesting return call for collateral.    Response:  LM  Electronically signed by Bryant Galindo, [facility] on 6/28/2018 at 1:30 PM        Bryant Galindo, [facility]

## 2018-06-29 LAB
ANION GAP SERPL CALCULATED.3IONS-SCNC: 14 MEQ/L (ref 7–13)
BUN BLDV-MCNC: 16 MG/DL (ref 6–20)
CALCIUM SERPL-MCNC: 9 MG/DL (ref 8.6–10.2)
CHLORIDE BLD-SCNC: 98 MEQ/L (ref 98–107)
CO2: 26 MEQ/L (ref 22–29)
CREAT SERPL-MCNC: 1.5 MG/DL (ref 0.7–1.2)
EKG ATRIAL RATE: 113 BPM
EKG ATRIAL RATE: 89 BPM
EKG P AXIS: 58 DEGREES
EKG P AXIS: 71 DEGREES
EKG P-R INTERVAL: 168 MS
EKG P-R INTERVAL: 186 MS
EKG Q-T INTERVAL: 330 MS
EKG Q-T INTERVAL: 404 MS
EKG QRS DURATION: 100 MS
EKG QRS DURATION: 106 MS
EKG QTC CALCULATION (BAZETT): 452 MS
EKG QTC CALCULATION (BAZETT): 491 MS
EKG R AXIS: -1 DEGREES
EKG R AXIS: -15 DEGREES
EKG T AXIS: 154 DEGREES
EKG T AXIS: 207 DEGREES
EKG VENTRICULAR RATE: 113 BPM
EKG VENTRICULAR RATE: 89 BPM
GFR AFRICAN AMERICAN: >60
GFR NON-AFRICAN AMERICAN: 51.8
GLUCOSE BLD-MCNC: 85 MG/DL (ref 74–109)
POTASSIUM SERPL-SCNC: 4.2 MEQ/L (ref 3.5–5.1)
PRO-BNP: 1045 PG/ML
SODIUM BLD-SCNC: 138 MEQ/L (ref 132–144)

## 2018-06-29 PROCEDURE — 93306 TTE W/DOPPLER COMPLETE: CPT

## 2018-06-29 PROCEDURE — 80048 BASIC METABOLIC PNL TOTAL CA: CPT

## 2018-06-29 PROCEDURE — 6370000000 HC RX 637 (ALT 250 FOR IP): Performed by: PSYCHIATRY & NEUROLOGY

## 2018-06-29 PROCEDURE — 99232 SBSQ HOSP IP/OBS MODERATE 35: CPT | Performed by: PSYCHIATRY & NEUROLOGY

## 2018-06-29 PROCEDURE — 83880 ASSAY OF NATRIURETIC PEPTIDE: CPT

## 2018-06-29 PROCEDURE — 1240000000 HC EMOTIONAL WELLNESS R&B

## 2018-06-29 PROCEDURE — 93005 ELECTROCARDIOGRAM TRACING: CPT

## 2018-06-29 PROCEDURE — 36415 COLL VENOUS BLD VENIPUNCTURE: CPT

## 2018-06-29 PROCEDURE — 6370000000 HC RX 637 (ALT 250 FOR IP): Performed by: NURSE PRACTITIONER

## 2018-06-29 RX ORDER — PALIPERIDONE 9 MG/1
9 TABLET, EXTENDED RELEASE ORAL DAILY
Qty: 30 TABLET | Refills: 0 | Status: SHIPPED | OUTPATIENT
Start: 2018-06-30

## 2018-06-29 RX ORDER — CARVEDILOL 6.25 MG/1
6.25 TABLET ORAL 2 TIMES DAILY WITH MEALS
Qty: 60 TABLET | Refills: 5 | Status: SHIPPED | OUTPATIENT
Start: 2018-06-29 | End: 2018-07-01

## 2018-06-29 RX ORDER — ISOSORBIDE DINITRATE AND HYDRALAZINE HYDROCHLORIDE 37.5; 2 MG/1; MG/1
1 TABLET ORAL 3 TIMES DAILY
Qty: 90 TABLET | Refills: 5 | Status: SHIPPED | OUTPATIENT
Start: 2018-06-29 | End: 2018-07-01 | Stop reason: HOSPADM

## 2018-06-29 RX ORDER — SERTRALINE HYDROCHLORIDE 100 MG/1
100 TABLET, FILM COATED ORAL DAILY
Qty: 15 TABLET | Refills: 2 | Status: SHIPPED | OUTPATIENT
Start: 2018-06-30 | End: 2018-07-02 | Stop reason: HOSPADM

## 2018-06-29 RX ORDER — TORSEMIDE 20 MG/1
20 TABLET ORAL DAILY
Qty: 30 TABLET | Refills: 5 | Status: SHIPPED | OUTPATIENT
Start: 2018-06-29 | End: 2018-07-01

## 2018-06-29 RX ADMIN — HYDRALAZINE HYDROCHLORIDE 25 MG: 25 TABLET, FILM COATED ORAL at 13:46

## 2018-06-29 RX ADMIN — PALIPERIDONE 9 MG: 6 TABLET, EXTENDED RELEASE ORAL at 09:19

## 2018-06-29 RX ADMIN — SERTRALINE 100 MG: 100 TABLET, FILM COATED ORAL at 09:19

## 2018-06-29 RX ADMIN — HYDRALAZINE HYDROCHLORIDE 25 MG: 25 TABLET, FILM COATED ORAL at 21:45

## 2018-06-29 NOTE — CONSULTS
Inpatient consult to Cardiology  Consult performed by: Toni Drake  Consult ordered by: Yulia Astorga               Cardiology Consult Note      Date:   6/29/2018  Patient name:  Gurpreet Marcus  Date of admission:  6/20/2018 12:24 PM  MRN:   [id number]  YOB: 1980  Time of Consult:  1:56 PM    Consulting Cardiologist: Carlos Quick MD    Referring Provider: Davina Lazcano PA-C    HPI:   Gurpreet Marcus is a 34 y.o.  male patient who is being at the request of Davina Lazcano PA-C for inpatient consultation of an abnormal EKG. He was admitted on 6/20/2018. Previous [address] [place] [address] and [facility] records have been reviewed in detail. He has a history of nonischemic chronic biventricular systolic congestive heart failure with previous LV ejection fraction as low as 15%. This was most recently noted on an echocardiogram performed August 17. He has New York Heart Association functional class 1-2 stage C congestive heart failure. He has a history of some medical noncompliance. He was last seen in the CHF clinic at [facility] in April of this year. Periodically he just stops taking his cardiac and psychotropic medications. He is status post previous ICD implant in 2014 for primary prevention of sudden cardiac death. Cardiac catheterization in 2008 showed normal coronary arteries. He has a history of extensive bilateral lower extremity deep venous thromboses and previously was noted to have thrombus extending up into the inferior vena cava. He was not able to have an inferior vena cava filter placed secondary to extensive thrombus burden. His been maintained chronically on Eliquis, although, he tells me he has not been taking that for the past few weeks. He has a history of sleep apnea but does not use CPAP. He has stage III chronic kidney disease. He has underlying schizoaffective disorder. He was admitted on June 20 with worsening depression and suicidal thoughts.   He has reportedly had a very poor appetite. He has had paranoid thoughts and is concerned that people are after him. He also has been struggling with issues related to his homosexuality. He notes from a cardiac standpoint he has actually been doing quite well. He specifically denies any chest pain, shortness breath, orthopnea, or PND. He denies increasing peripheral edema. He denies any palpitations, lightheaded, or near-syncope. He was noted during this admission to have underlying normal sinus rhythm with significant abnormalities on his EKG. His EKG changes are similar to previous tracings dating back to 2014. He had an echocardiogram done at the bedside and that shows severe LV dysfunction with ejection fraction of 20-25%. Allergies:  No Known Allergies    Past Medical History:  Past Medical History:   Diagnosis Date    CHF (congestive heart failure) ([facility] 2007    Dyspnea     Elevated troponin     HTN (hypertension)     Hypothyroid     Morbid obesity ([facility]     Renal failure (ARF), acute on chronic ([facility]) 4/2013    Respiratory failure with hypercapnia ([facility] 4/2013       Past Surgical History:  Past Surgical History:   Procedure Laterality Date    ABDOMEN SURGERY      \"as a baby for stomach blockage\"    STOMACH SURGERY      infant       Family History:   Family History   Problem Relation Age of Onset    High Blood Pressure Father     Diabetes Maternal Grandmother     High Blood Pressure Maternal Grandfather     Hypertension Neg Hx     High Cholesterol Neg Hx     Heart Attack Neg Hx     Heart Surgery Neg Hx        Social  History:     Social History   Substance Use Topics    Smoking status: Light Tobacco Smoker     Types: Cigarettes    Smokeless tobacco: Never Used      Comment: 1 st of month when has cigarettes    Alcohol use 1.8 oz/week     3 Cans of beer per week       Home Medications:    Prior to Admission medications    Medication Sig Start Date End Date Taking?  Authorizing Provider Nichole Xiong MD   50 mg at 06/27/18 2220    magnesium hydroxide (MILK OF MAGNESIA) 400 MG/5ML suspension 30 mL  30 mL Oral Daily PRN Nichole Xiong MD        aluminum & magnesium hydroxide-simethicone (MAALOX) 200-200-20 MG/5ML suspension 30 mL  30 mL Oral PRN Jose J Cano MD        hydrOXYzine (VISTARIL) injection 50 mg  50 mg Intramuscular Q6H PRN Jose J Cano MD        Or    hydrOXYzine (VISTARIL) capsule 50 mg  50 mg Oral Q6H PRN Jose J Cano MD   50 mg at 06/21/18 0842    haloperidol lactate (HALDOL) injection 5 mg  5 mg Intramuscular Q6H PRN Jose J Cano MD        Or    haloperidol (HALDOL) tablet 5 mg  5 mg Oral Q6H PRN Jose J Cano MD   5 mg at 06/22/18 1658    diphenhydrAMINE (BENADRYL) capsule 50 mg  50 mg Oral Q6H PRN Jose J Cano MD   50 mg at 06/22/18 1658    Or    diphenhydrAMINE (BENADRYL) injection 50 mg  50 mg Intramuscular Q6H PRN Nichole Xiong MD           ROS:   12 point review of systems was obtained in detail and is negative other than that noted above or below. Vital Signs:  Vitals:    06/28/18 2015 06/29/18 0633 06/29/18 0856 06/29/18 1330   BP: 100/63 126/65 (!) 142/89 (!) 148/87   Pulse: 110 59 105    Resp: 18 18 18    Temp:   98 °F (36.7 °C)    TempSrc:   Oral    SpO2:   92%    Weight:       Height:           Physical Examination:  GENERAL APPEARANCE: Well developed, well nourished, Morbidly obese, in no acute distress. CHEST: Symmetric and non-tender. INTEGUMENT: Skin warm and dry, without gross excoriationis or lesions. HEENT: No gross abnormalities of conjunctiva, teeth, gums, oral mucosa  NECK: Supple, no JVD, no bruit. Thyroid not palpable.  Carotid upstrokes normal.  NEURO/PSHCY: Alert and oriented x3; appropriate behavior and responses and responses, grossly normal cerebellar function with normal balance and coordination  LUNGS: Clear to auscultation bilaterally; normal

## 2018-06-29 NOTE — CARE COORDINATION
FAMILY COLLATERAL NOTE    Family/Support Name: Ailyn Solares  Contact #:[phone]  Relationship to Pt[de-identified] mom        Family/Support contact aware of hospitalization: yes  Presenting Symptoms/Current Concerns:he stays in his apartment, he is a loner, we try to pick him up and do things with him. My niece saw a picture on facebook where he was naked in the lobby of the apartment building where he lives and the police came. His sister will take him to his [facility] apptmt and also the injection at [facility]. He has always been like this. Top 3 Life Stressors: 1. Illness 2. Has a problem with homosexuality 3. His weight- self-esteem issues        Background History Relevant to Current Hospitalization:he ended up making his way to [place] with no shoes on last month just wandering around. He gets amrita and just takes off. He disappears two weeks at a time. [facility] gave him a script and he flushed it down the toilet. Family Mental Health/Substance Use History: moms brother gets very stressed out but she is not sure of diagnosis        Support Network's Goal for Hospitalization: to stabilize and follow up with treatment    Discharge Plan: Discharge home, sisterKim will pick him up at 3:30. Support Network Supportive of Discharge Plan: yes      Support can confirm Safety of Location and Security of Weapons: none that I know of. Support agreeable to Safeguard and Monitor Medications (including Prescription and OTC): does not take meds and flushes the script down the toilet. Identified Barriers to Compliance with Discharge Plan: not following through with treatment.      Recommendations for Support Network: Call [facility] or [facility] if any questions  Electronically signed by [facility] on 6/29/2018 at 9:58 AM        [facility]

## 2018-06-29 NOTE — CARE COORDINATION
Pt. Calm and cooperative. Denies all this assessment. 10/10 depression and 10/10 anxiety. Avoids gaze and isolates to room. Reports good sleep. Pt. Syed Crisp in bed all day resting. Skipped breakfast and lunch. Encouraged fluids and pt. Drank 2 cups of ginger ale. Pt. Continues to report that he lies in bed at home all day also and his appetite is the same at home.

## 2018-06-29 NOTE — DISCHARGE SUMMARY
DISCHARGE SUMMARY      Patient ID:  Susana Sheikh  [id number]  13 y.o.  1980    Admit date: 6/20/2018    Discharge date and time: 7/2/18    Admitting Physician: Durga Cedeno MD     Discharge Physician: Dr Pavel Melendez MD    Admission Diagnoses: Schizoaffective disorder ([facility] [F25.9]  Schizoaffective disorder ([facility] [F25.9]    Admission Condition: poor    Discharged Condition: stable    Admission Circumstance:      The patient is a 34 y.o. male with significant past history of Schizoaffective disorder who presents with depression  Patient report depressive symptoms, rating mood to be around 2/10 (10- good)  Pt has hopeless, worthless and helpless feeling  Suicidal thoughts - wanting to die by running into traffic  Anxious about ongoing stressor, still ruminating about it  Pt has poor concentration, anhedonia, decrease motivation  Poor sleep and appetite     Psych ROS:  Manic symptoms- no  Auditory hallucination - hearing voice, ask to kill self  Visual hallucination no  Paranoid thoughts- paranoid that people are after him  PTSD - no     Stressors:homosexuality issue - parents and other family has not come to terms with his issue, relationship with his partner not going well     The patient is not currently receiving care for the above psychiatric illness.     Medications Prior to Admission:   Prescriptions Prior to Admission: torsemide (DEMADEX) 20 MG tablet, Take 20 mg by mouth daily  potassium chloride (KLOR-CON M) 10 MEQ extended release tablet, Take 10 mEq by mouth 2 times daily  apixaban (ELIQUIS) 5 MG TABS tablet, Take by mouth 2 times daily  isosorbide-hydrALAZINE (BIDIL) 20-37.5 MG per tablet, Take 1 tablet by mouth 3 times daily  carvedilol (COREG) 6.25 MG tablet, Take 1 tablet by mouth 2 times daily (with meals).     Compliance:not been taking meds for 5 years        Substance Abuse History:  ETOH: beer 6 per day  Marijuana: daily  Opiates: no  Other Drugs: cocaine on and off     Past Psychiatric MD    paliperidone (INVEGA) extended release tablet 9 mg, 9 mg, Oral, Daily, Suzie Guardado MD, 9 mg at 06/29/18 0919    sertraline (ZOLOFT) tablet 100 mg, 100 mg, Oral, Daily, Nichole Xiong MD, 100 mg at 06/29/18 0919    benztropine (COGENTIN) tablet 2 mg, 2 mg, Oral, BID PRN **OR** benztropine mesylate (COGENTIN) injection 2 mg, 2 mg, Intramuscular, BID PRN, Suzie Guardado MD    hydrALAZINE (APRESOLINE) tablet 25 mg, 25 mg, Oral, 3 times per day, OCTAVIO Castellano CNP, Stopped at 06/28/18 2035    acetaminophen (TYLENOL) tablet 650 mg, 650 mg, Oral, Q4H PRN, Nichole Xiong MD, 650 mg at 06/26/18 1706    traZODone (DESYREL) tablet 50 mg, 50 mg, Oral, Nightly PRN, Nichole Xiong MD, 50 mg at 06/27/18 2220    magnesium hydroxide (MILK OF MAGNESIA) 400 MG/5ML suspension 30 mL, 30 mL, Oral, Daily PRN, Nichole Xiong MD    aluminum & magnesium hydroxide-simethicone (MAALOX) 200-200-20 MG/5ML suspension 30 mL, 30 mL, Oral, PRN, Ryan Cano MD    hydrOXYzine (VISTARIL) injection 50 mg, 50 mg, Intramuscular, Q6H PRN **OR** hydrOXYzine (VISTARIL) capsule 50 mg, 50 mg, Oral, Q6H PRN, Nichole Xiong MD, 50 mg at 06/21/18 0842    haloperidol lactate (HALDOL) injection 5 mg, 5 mg, Intramuscular, Q6H PRN **OR** haloperidol (HALDOL) tablet 5 mg, 5 mg, Oral, Q6H PRN, Nichole Xiong MD, 5 mg at 06/22/18 1658    diphenhydrAMINE (BENADRYL) capsule 50 mg, 50 mg, Oral, Q6H PRN, 50 mg at 06/22/18 1658 **OR** diphenhydrAMINE (BENADRYL) injection 50 mg, 50 mg, Intramuscular, Q6H PRN, Nichole Xiong MD    Examination:  BP (!) 142/89 Comment: Nurse Aware  Pulse 105   Temp 98 °F (36.7 °C) (Oral)   Resp 18   Ht 6' (1.829 m)   Wt 215 lb (97.5 kg)   SpO2 92%   BMI 29.16 kg/m²   Gait - steady    HOSPITAL COURSE[de-identified]  Following admission to the hospital, patient had a complete physical exam and blood work up  Patient was monitored closely with [phone]   · paliperidone 9 MG extended release tablet  · sertraline 100 MG tablet     You can get these medications from any pharmacy    Bring a paper prescription for each of these medications  · paliperidone palmitate 156 MG/ML Susp IM injection           TIME SPEND - 35 MINUTES TO COMPLETE THE EVALUATION, DISCHARGE SUMMARY, MEDICATION RECONCILIATION AND FOLLOW UP CARE     Electronically signed by Adelfo Blum MD on 7/2/2018 at 9:08 AM

## 2018-06-29 NOTE — PROGRESS NOTES
injection 50 mg  50 mg Intramuscular Q6H PRN Jose J Cano MD        Or    hydrOXYzine (VISTARIL) capsule 50 mg  50 mg Oral Q6H PRN Jose J Cano MD   50 mg at 06/21/18 0842    haloperidol lactate (HALDOL) injection 5 mg  5 mg Intramuscular Q6H PRN Jose J Cano MD        Or    haloperidol (HALDOL) tablet 5 mg  5 mg Oral Q6H PRN Jose J Cano MD   5 mg at 06/22/18 1658    diphenhydrAMINE (BENADRYL) capsule 50 mg  50 mg Oral Q6H PRN Jose J Cano MD   50 mg at 06/22/18 1658    Or    diphenhydrAMINE (BENADRYL) injection 50 mg  50 mg Intramuscular Q6H PRN Jose J Cano MD           OBJECTIVE  PHYSICAL EXAM:   BP (!) 142/89 Comment: Nurse Aware  Pulse 105   Temp 98 °F (36.7 °C) (Oral)   Resp 18   Ht 6' (1.829 m)   Wt 215 lb (97.5 kg)   SpO2 92%   BMI 29.16 kg/m²   Body mass index is 29.16 kg/m². CONSTITUTIONAL:  alert, mild distress and morbidly obese  EYES:  pupils equal, round and reactive to light, sclera clear and conjunctiva normal  ENT:  normocepalic, without obvious abnormality, atraumatic, good dentition, oral pharynx with moist mucus membranes, tonsils without erythema or exudates  LUNGS:  no increased work of breathing and clear to auscultation  CARDIOVASCULAR:  normal apical pulses and normal S1 and S2  ABDOMEN:  normal bowel sounds and non-tender  MUSCULOSKELETAL: trace BLE edema, + distal pulses  full range of motion noted  NEUROLOGIC:  Mental Status Exam:  Level of Alertness:   awake  Orientation:   person, place, time  Cranial Nerves:  cranial nerves II-XII are grossly intact  SKIN:  Dry flaking skin    DATA:     No results found for this or any previous visit (from the past 24 hour(s)). ASSESSMENT AND PLAN   1. Chronic sytolic HF EF 35%  2. EKG changes, ischemia  3. CKDIII  4.   HTN      Consult cardiology  2decho, EKG now  Bmp now  Nursing staff to schedule f/u appt w/nephrologist and PCP prior to discharge      SIGNATURE:

## 2018-06-30 PROCEDURE — 1240000000 HC EMOTIONAL WELLNESS R&B

## 2018-06-30 PROCEDURE — 6370000000 HC RX 637 (ALT 250 FOR IP): Performed by: PSYCHIATRY & NEUROLOGY

## 2018-06-30 PROCEDURE — 6370000000 HC RX 637 (ALT 250 FOR IP): Performed by: NURSE PRACTITIONER

## 2018-06-30 RX ADMIN — HYDRALAZINE HYDROCHLORIDE 25 MG: 25 TABLET, FILM COATED ORAL at 20:29

## 2018-06-30 RX ADMIN — HYDRALAZINE HYDROCHLORIDE 25 MG: 25 TABLET, FILM COATED ORAL at 14:10

## 2018-06-30 RX ADMIN — HYDRALAZINE HYDROCHLORIDE 25 MG: 25 TABLET, FILM COATED ORAL at 05:56

## 2018-06-30 RX ADMIN — TRAZODONE HYDROCHLORIDE 50 MG: 50 TABLET ORAL at 20:38

## 2018-06-30 NOTE — PROGRESS NOTES
hospital problems. *      LABS:    No results for input(s): WBC, HGB, PLT in the last 72 hours. Recent Labs      06/29/18   1238   NA  138   K  4.2   CL  98   CO2  26   BUN  16   CREATININE  1.50*   GLUCOSE  85     No results for input(s): BILITOT, ALKPHOS, AST, ALT in the last 72 hours. Lab Results   Component Value Date    LABAMPH Neg 06/20/2018    BARBSCNU Neg 06/20/2018    LABBENZ Neg 06/20/2018    OPIATESCREENURINE Neg 06/20/2018    PHENCYCLIDINESCREENURINE Neg 06/20/2018    ETOH <10 06/20/2018     Lab Results   Component Value Date    TSH 1.870 06/20/2018     No results found for: LITHIUM  No results found for: VALPROATE, CBMZ    Treatment Plan:  Reviewed current Medications with the patient. Risks, benefits, side effects, drug-to-drug interactions and alternatives to treatment were discussed. Collateral information: pending, pt gave number for his mom  CD evaluation  Encourage patient to attend group and other milieu activities. Patient's condition seems to have worsened; he is regressing; not eating, self-isolating, hallucinations worsening, depression worsening. Given the patient's presentation and worsening of symptoms, will not consider discharge today. Will consider increasing antidepressant.     PSYCHOTHERAPY/COUNSELING:  [x] Therapeutic interview  [x] Supportive  [] CBT  [] Ongoing  [] Other     [x] Patient continues to need, on a daily basis, active treatment furnished directly by or requiring the supervision of inpatient psychiatric personnel      Anticipated Length of stay:            Electronically signed by Yuan Ochoa MD on 6/30/2018 at 6:26 PM

## 2018-06-30 NOTE — FLOWSHEET NOTE
Pt has isolated to room all day despite encouragement to come out. Pt says his mood is \"so-so\" Pt noted to not eat today but did drink a supplement. Pt says \"it is the same old , same old. \" When asked what this means pt says \" I am just tired of being here and I want to go home. \" Pt admits to depression and anxiety 5/10. Pt denies SI/HI but admits to audio and visual hallucinations that are \" a bit worse\" today and pt relates this to stress.

## 2018-06-30 NOTE — FLOWSHEET NOTE
Pt given breakfast try. Pt talking with opening eyes and saying in an irritable tone that he does not want to eat. Pt says \"and I don't want those medications, they make my voices worse. \" Dr. Maureen Hernandez aware.

## 2018-06-30 NOTE — PROGRESS NOTES
Patient did not attend evening groups, despite much encouragement.  Electronically signed by Zahraa Frederick RN on 6/29/18 at 10:14 PM

## 2018-07-01 PROCEDURE — 6370000000 HC RX 637 (ALT 250 FOR IP): Performed by: PSYCHIATRY & NEUROLOGY

## 2018-07-01 PROCEDURE — 6370000000 HC RX 637 (ALT 250 FOR IP): Performed by: NURSE PRACTITIONER

## 2018-07-01 PROCEDURE — 1240000000 HC EMOTIONAL WELLNESS R&B

## 2018-07-01 RX ORDER — CARVEDILOL 6.25 MG/1
6.25 TABLET ORAL 2 TIMES DAILY WITH MEALS
Status: DISCONTINUED | OUTPATIENT
Start: 2018-07-01 | End: 2018-07-02 | Stop reason: HOSPADM

## 2018-07-01 RX ORDER — HYDRALAZINE HYDROCHLORIDE 25 MG/1
25 TABLET, FILM COATED ORAL 3 TIMES DAILY
Qty: 90 TABLET | Refills: 3 | Status: SHIPPED | OUTPATIENT
Start: 2018-07-01

## 2018-07-01 RX ORDER — TORSEMIDE 20 MG/1
20 TABLET ORAL DAILY
Qty: 30 TABLET | Refills: 5 | Status: SHIPPED | OUTPATIENT
Start: 2018-07-01

## 2018-07-01 RX ORDER — ISOSORBIDE DINITRATE 20 MG/1
20 TABLET ORAL 3 TIMES DAILY
Qty: 90 TABLET | Refills: 3 | Status: SHIPPED | OUTPATIENT
Start: 2018-07-01

## 2018-07-01 RX ORDER — POTASSIUM CHLORIDE 20 MEQ/1
10 TABLET, EXTENDED RELEASE ORAL 2 TIMES DAILY
Status: DISCONTINUED | OUTPATIENT
Start: 2018-07-01 | End: 2018-07-02 | Stop reason: HOSPADM

## 2018-07-01 RX ORDER — CARVEDILOL 6.25 MG/1
6.25 TABLET ORAL 2 TIMES DAILY WITH MEALS
Qty: 60 TABLET | Refills: 5 | Status: SHIPPED | OUTPATIENT
Start: 2018-07-01

## 2018-07-01 RX ORDER — POTASSIUM CHLORIDE 750 MG/1
10 TABLET, EXTENDED RELEASE ORAL 2 TIMES DAILY
Qty: 60 TABLET | Refills: 3 | Status: SHIPPED | OUTPATIENT
Start: 2018-07-01

## 2018-07-01 RX ORDER — TORSEMIDE 20 MG/1
20 TABLET ORAL DAILY
Status: DISCONTINUED | OUTPATIENT
Start: 2018-07-01 | End: 2018-07-02 | Stop reason: HOSPADM

## 2018-07-01 RX ORDER — ISOSORBIDE DINITRATE 20 MG/1
20 TABLET ORAL 3 TIMES DAILY
Status: DISCONTINUED | OUTPATIENT
Start: 2018-07-01 | End: 2018-07-02 | Stop reason: HOSPADM

## 2018-07-01 RX ADMIN — ISOSORBIDE DINITRATE 20 MG: 20 TABLET ORAL at 21:04

## 2018-07-01 RX ADMIN — APIXABAN 5 MG: 2.5 TABLET, FILM COATED ORAL at 13:56

## 2018-07-01 RX ADMIN — POTASSIUM CHLORIDE 10 MEQ: 20 TABLET, EXTENDED RELEASE ORAL at 13:56

## 2018-07-01 RX ADMIN — TORSEMIDE 20 MG: 20 TABLET ORAL at 16:07

## 2018-07-01 RX ADMIN — HYDRALAZINE HYDROCHLORIDE 25 MG: 25 TABLET, FILM COATED ORAL at 13:57

## 2018-07-01 RX ADMIN — HYDROXYZINE PAMOATE 50 MG: 50 CAPSULE ORAL at 21:09

## 2018-07-01 RX ADMIN — CARVEDILOL 6.25 MG: 6.25 TABLET, FILM COATED ORAL at 16:07

## 2018-07-01 RX ADMIN — ISOSORBIDE DINITRATE 20 MG: 20 TABLET ORAL at 16:07

## 2018-07-01 RX ADMIN — POTASSIUM CHLORIDE 10 MEQ: 20 TABLET, EXTENDED RELEASE ORAL at 21:03

## 2018-07-01 RX ADMIN — HYDRALAZINE HYDROCHLORIDE 25 MG: 25 TABLET, FILM COATED ORAL at 21:04

## 2018-07-01 RX ADMIN — TRAZODONE HYDROCHLORIDE 50 MG: 50 TABLET ORAL at 21:09

## 2018-07-01 RX ADMIN — HYDRALAZINE HYDROCHLORIDE 25 MG: 25 TABLET, FILM COATED ORAL at 06:10

## 2018-07-01 RX ADMIN — APIXABAN 5 MG: 2.5 TABLET, FILM COATED ORAL at 21:03

## 2018-07-01 NOTE — PROGRESS NOTES
Pt medicated with 50 mg Trazodone for insomnia. Electronically signed by Lata Dudley RN on 6/30/18 at 8:39 PM

## 2018-07-01 NOTE — BH NOTE
Group Therapy Note    Date: 6/30/2018  Start Time: 1600  End Time:  1630  Number of Participants: 12    Type of Group: Healthy Living/Wellness    Notes:  Pt did not attend group.     Electronically signed by Rebecca Montgomery on 6/30/2018 at 11:00 PM

## 2018-07-01 NOTE — PROGRESS NOTES
[START ON 7/26/2018] paliperidone palmitate (INVEGA SUSTENNA) IM injection 156 mg, 156 mg, Intramuscular, Q30 Days, Chino Galvez MD    paliperidone (INVEGA) extended release tablet 9 mg, 9 mg, Oral, Daily, Chino Galvez MD, Stopped at 06/30/18 0829    benztropine (COGENTIN) tablet 2 mg, 2 mg, Oral, BID PRN **OR** benztropine mesylate (COGENTIN) injection 2 mg, 2 mg, Intramuscular, BID PRN, Chino Galvez MD    hydrALAZINE (APRESOLINE) tablet 25 mg, 25 mg, Oral, 3 times per day, Rolando Cyphers, APRN - CNP, 25 mg at 07/01/18 1357    acetaminophen (TYLENOL) tablet 650 mg, 650 mg, Oral, Q4H PRN, Ingrid Cano MD, 650 mg at 06/26/18 1706    traZODone (DESYREL) tablet 50 mg, 50 mg, Oral, Nightly PRN, Aurelia Monson MD, 50 mg at 06/30/18 2038    magnesium hydroxide (MILK OF MAGNESIA) 400 MG/5ML suspension 30 mL, 30 mL, Oral, Daily PRN, Ingrid Cano MD    aluminum & magnesium hydroxide-simethicone (MAALOX) 200-200-20 MG/5ML suspension 30 mL, 30 mL, Oral, PRN, Ingrid Cano MD    hydrOXYzine (VISTARIL) injection 50 mg, 50 mg, Intramuscular, Q6H PRN **OR** hydrOXYzine (VISTARIL) capsule 50 mg, 50 mg, Oral, Q6H PRN, Jose J Cano MD, 50 mg at 06/21/18 0842    haloperidol lactate (HALDOL) injection 5 mg, 5 mg, Intramuscular, Q6H PRN **OR** haloperidol (HALDOL) tablet 5 mg, 5 mg, Oral, Q6H PRN, Ingrid Cnao MD, 5 mg at 06/22/18 1658    diphenhydrAMINE (BENADRYL) capsule 50 mg, 50 mg, Oral, Q6H PRN, 50 mg at 06/22/18 1658 **OR** diphenhydrAMINE (BENADRYL) injection 50 mg, 50 mg, Intramuscular, Q6H PRN, Jose J Cano MD      Examination:  /69   Pulse 96   Temp 98 °F (36.7 °C) (Oral)   Resp 20   Ht 6' (1.829 m)   Wt 215 lb (97.5 kg)   SpO2 93%   BMI 29.16 kg/m²   Gait - steady  Medication side effects(SE): no    Mental Status Examination:    Level of consciousness:  within normal limits   Appearance:  poor grooming and poor hygiene  Behavior/Motor:  psychomotor retardation  Attitude toward examiner: limitedly cooperative  Speech:  slow   Mood: dysthymic  Affect:  flat  Thought processes:  slow   Thought content:  Delusions: denies paranoia; possible ideas of reference; denies suicidal ideations  Perceptual Disturbance: Auditory hallucinations  Cognition:  oriented to person, place, and time   Concentration distractible  Insight poor   Judgement poor    ASSESSMENT:   Patient symptoms are:  [] Well controlled  [] Improving  [x] Worsening  [] No change      Diagnosis:   Principal Problem:    Schizoaffective disorder ([facility]  Resolved Problems:    * No resolved hospital problems. *      LABS:    No results for input(s): WBC, HGB, PLT in the last 72 hours. Recent Labs      06/29/18   1238   NA  138   K  4.2   CL  98   CO2  26   BUN  16   CREATININE  1.50*   GLUCOSE  85     No results for input(s): BILITOT, ALKPHOS, AST, ALT in the last 72 hours. Lab Results   Component Value Date    LABAMPH Neg 06/20/2018    BARBSCNU Neg 06/20/2018    LABBENZ Neg 06/20/2018    OPIATESCREENURINE Neg 06/20/2018    PHENCYCLIDINESCREENURINE Neg 06/20/2018    ETOH <10 06/20/2018     Lab Results   Component Value Date    TSH 1.870 06/20/2018     No results found for: LITHIUM  No results found for: VALPROATE, CBMZ    Treatment Plan:  Reviewed current Medications with the patient. Risks, benefits, side effects, drug-to-drug interactions and alternatives to treatment were discussed. Refuses medications  CD evaluation  Encourage patient to attend group and other milieu activities. Patient's condition seems to have worsened; he is regressing; not eating, self-isolating, hallucinations worsening, depression worsening. Given the patient's presentation and worsening of symptoms, will not consider discharge today.    PSYCHOTHERAPY/COUNSELING:  [x] Therapeutic interview  [x] Supportive  [] CBT  [] Ongoing  [] Other     [x] Patient continues to need, on a daily basis, active treatment furnished directly by or requiring the supervision of inpatient psychiatric personnel      Anticipated Length of stay:            Electronically signed by Vinay Grier MD on 7/1/2018 at 6:32 PM

## 2018-07-01 NOTE — PROGRESS NOTES
Pt. refused to attend the 1000 skills group, due to resting in room despite staff encouragement. Electronically signed by Angeilca Ruiz, [address] on 7/1/2018 at 2:39 PM

## 2018-07-01 NOTE — PROGRESS NOTES
Pt. refused to attend the 0900 community meeting due to resting in room, despite staff encouragement. Electronically signed by Maxine Soto [address] on 7/1/2018 at 10:53 AM

## 2018-07-01 NOTE — PROGRESS NOTES
Patient refused am medications. .Electronically signed by Parth Sepulveda LPN on 8/8/5669 at 7:32 AM  Patient denies any suicidal and homicidal ideations at this time. Patient is having depression and anxiety rates it 5/10 which is generalized. Asked if hearing voices patient said \"yes\", asked what voices are saying   Patient said \"i don't want to go into it, it is family. Asked if voices are telling you to hurt yourself or anyone else patient said \"no. \"  . Electronically signed by Parth Sepulveda LPN on 7/0/4067 at 4:18 AM

## 2018-07-01 NOTE — PROGRESS NOTES
exhibits no distension. Musculoskeletal: Normal range of motion. He exhibits no edema or tenderness. Neurological: He is alert and oriented to person, place, and time. Skin: Skin is warm and dry. Psychiatric:   Slightly withdrawn        Medications:    apixaban  5 mg Oral BID    carvedilol  6.25 mg Oral BID WC    torsemide  20 mg Oral Daily    potassium chloride  10 mEq Oral BID    isosorbide dinitrate  20 mg Oral TID    sertraline  150 mg Oral Daily    [START ON 7/5/2018] paliperidone palmitate  156 mg Intramuscular Once    Followed by   [place] Na ON 7/26/2018] paliperidone palmitate  156 mg Intramuscular Q30 Days    paliperidone  9 mg Oral Daily    hydrALAZINE  25 mg Oral 3 times per day         benztropine 2 mg BID PRN   Or     benztropine mesylate 2 mg BID PRN   acetaminophen 650 mg Q4H PRN   traZODone 50 mg Nightly PRN   magnesium hydroxide 30 mL Daily PRN   aluminum & magnesium hydroxide-simethicone 30 mL PRN   hydrOXYzine 50 mg Q6H PRN   Or     hydrOXYzine 50 mg Q6H PRN   haloperidol lactate 5 mg Q6H PRN   Or     haloperidol 5 mg Q6H PRN   diphenhydrAMINE 50 mg Q6H PRN   Or     diphenhydrAMINE 50 mg Q6H PRN         Lab Data:    Cardiac Enzymes:  No results for input(s): CKTOTAL, CKMB, CKMBINDEX, TROPONINI in the last 72 hours.   ProBNP:   Lab Results   Component Value Date    PROBNP 1,045 06/29/2018       CBC:   Lab Results   Component Value Date    WBC 6.0 06/20/2018    RBC 5.22 06/20/2018    RBC 5.64 03/09/2018    HGB 16.1 06/20/2018    HCT 49.7 06/20/2018     06/20/2018       BMP: @(bmp:3)@    CMP:  Lab Results   Component Value Date     06/29/2018    K 4.2 06/29/2018    CL 98 06/29/2018    CO2 26 06/29/2018    BUN 16 06/29/2018    CREATININE 1.50 06/29/2018    GFRAA >60.0 06/29/2018    GFRAA >60 04/05/2013    AGRATIO 1.4 03/09/2018    LABGLOM 51.8 06/29/2018    GLUCOSE 85 06/29/2018    GLUCOSE 115 03/09/2018    CALCIUM 9.0 06/29/2018       Hepatic Function Panel:  Lab Results

## 2018-07-02 VITALS
SYSTOLIC BLOOD PRESSURE: 131 MMHG | HEIGHT: 72 IN | RESPIRATION RATE: 18 BRPM | BODY MASS INDEX: 29.12 KG/M2 | DIASTOLIC BLOOD PRESSURE: 61 MMHG | OXYGEN SATURATION: 92 % | TEMPERATURE: 97 F | HEART RATE: 51 BPM | WEIGHT: 215 LBS

## 2018-07-02 PROCEDURE — 6370000000 HC RX 637 (ALT 250 FOR IP): Performed by: NURSE PRACTITIONER

## 2018-07-02 PROCEDURE — 6370000000 HC RX 637 (ALT 250 FOR IP): Performed by: PSYCHIATRY & NEUROLOGY

## 2018-07-02 PROCEDURE — 93290 INTERROG DEV EVAL ICPMS IP: CPT

## 2018-07-02 PROCEDURE — 93010 ELECTROCARDIOGRAM REPORT: CPT | Performed by: INTERNAL MEDICINE

## 2018-07-02 PROCEDURE — 99239 HOSP IP/OBS DSCHRG MGMT >30: CPT | Performed by: PSYCHIATRY & NEUROLOGY

## 2018-07-02 PROCEDURE — 93283 PRGRMG EVAL IMPLANTABLE DFB: CPT

## 2018-07-02 RX ADMIN — APIXABAN 5 MG: 2.5 TABLET, FILM COATED ORAL at 09:20

## 2018-07-02 RX ADMIN — POTASSIUM CHLORIDE 10 MEQ: 20 TABLET, EXTENDED RELEASE ORAL at 09:22

## 2018-07-02 RX ADMIN — ISOSORBIDE DINITRATE 20 MG: 20 TABLET ORAL at 14:57

## 2018-07-02 RX ADMIN — DIPHENHYDRAMINE HYDROCHLORIDE 50 MG: 50 CAPSULE ORAL at 01:57

## 2018-07-02 RX ADMIN — ISOSORBIDE DINITRATE 20 MG: 20 TABLET ORAL at 09:22

## 2018-07-02 RX ADMIN — PALIPERIDONE 9 MG: 6 TABLET, EXTENDED RELEASE ORAL at 09:20

## 2018-07-02 RX ADMIN — TORSEMIDE 20 MG: 20 TABLET ORAL at 09:21

## 2018-07-02 RX ADMIN — CARVEDILOL 6.25 MG: 6.25 TABLET, FILM COATED ORAL at 09:20

## 2018-07-02 RX ADMIN — SERTRALINE 150 MG: 100 TABLET, FILM COATED ORAL at 09:19

## 2018-07-02 RX ADMIN — HYDRALAZINE HYDROCHLORIDE 25 MG: 25 TABLET, FILM COATED ORAL at 14:57

## 2018-07-02 RX ADMIN — HYDRALAZINE HYDROCHLORIDE 25 MG: 25 TABLET, FILM COATED ORAL at 05:45

## 2018-07-02 NOTE — BH NOTE
Pt did not attend Wellness group at 1615. Pt did not attend Recreation group at 9 [facility]. Pt did not attend Wrap Up group at 2100. Pt did not attend Relaxation group at 2115.     Electronically signed by Nico Richardson on 7/1/2018 at 10:35 PM

## 2018-07-02 NOTE — PROGRESS NOTES
Pt. declined to attend the 0900 community meeting, despite staff encouragement.  Electronically signed by Maksim Brown on 7/2/2018 at 9:45 AM

## 2018-07-02 NOTE — PROGRESS NOTES
Pt has isolated in bed due to no motivation to attend groups \"i just dont feel like it\", reports poor appetite but does attempt to eat partial meals in his room. Denies SI, reports hearing voices \"i always have them, they say general things\". Pt reports feeling ready to go home today.

## 2018-07-02 NOTE — PROGRESS NOTES
Pt noted laying in bed all am , refused breakfast, stated he is still very depressed #10 informed patient's zoloft was increased to 150mg,  Explained and reviewed all new cardiac meds. Encouraged pt to not miss doses. Pt stated he hears his families voices, stated he misses his family, and reports that he wont be able to visit them at discharge reports living alone. Pt denied suicidal thoughts.

## 2018-07-02 NOTE — CARE COORDINATION
Pt and sister Kelly Pittman given all discharge information and instructions and able to verbalize an understanding of same. Pt signed all discharge documentation. Pt denies any suicidal or homicidal ideations or hallucinations. Pt left unit with sister for discharge to home. Belongings given to pt. Pt denies any current suicidal ideation, homicidal ideation or hallucinations.    Mood and affect stable

## 2018-07-02 NOTE — PROGRESS NOTES
BEHAVIORAL HEALTH FOLLOW-UP NOTE     7/2/2018     Patient was seen and examined in person, Chart reviewed   Patient's case discussed with staff/team    Chief Complaint: Depression    Interim History:     Pt remain the same  Seclusive in his room  Apathetic with poor motivation  Pt c/o vague AH - non commanding  Sleeping better  Denies paranoid thoughts  No active SI  Discharge focused    Appetite:   [] Normal/Unchanged  [] Increased  [x] Decreased      Sleep:       [] Normal/Unchanged  [x] Fair       [] Poor              Energy:    [] Normal/Unchanged  [] Increased  [x] Decreased        SI [] Present  [x] Absent    HI  []Present  [x] Absent     Aggression:  [] yes  [x] no    Patient is [] able  [] unable to CONTRACT FOR SAFETY     PAST MEDICAL/PSYCHIATRIC HISTORY:   Past Medical History:   Diagnosis Date    CHF (congestive heart failure) ([facility] 2007    Chronic bilateral deep vein thrombosis (DVT) of femoral veins (HCC)     Dyspnea     Elevated troponin     HTN (hypertension)     Hypothyroid     Morbid obesity ([facility]     Renal failure (ARF), acute on chronic ([facility] 4/2013    Respiratory failure with hypercapnia ([facility] 4/2013       FAMILY/SOCIAL HISTORY:  Family History   Problem Relation Age of Onset    High Blood Pressure Father     Diabetes Maternal Grandmother     High Blood Pressure Maternal Grandfather     Hypertension Neg Hx     High Cholesterol Neg Hx     Heart Attack Neg Hx     Heart Surgery Neg Hx      Social History     Social History    Marital status: Single     Spouse name: N/A    Number of children: N/A    Years of education: N/A     Occupational History    Not on file.      Social History Main Topics    Smoking status: Light Tobacco Smoker     Types: Cigarettes    Smokeless tobacco: Never Used      Comment: 1 st of month when has cigarettes    Alcohol use 1.8 oz/week     3 Cans of beer per week    Drug use: No    Sexual activity: Not on file     Other Topics Concern    Not on file Social History Narrative    ** Merged History Encounter **                ROS:  [x] All negative/unchanged except if checked.  Explain positive(checked items) below:  [] Constitutional  [] Eyes  [] Ear/Nose/Mouth/Throat  [] Respiratory  [] CV  [] GI  []   [] Musculoskeletal  [] Skin/Breast  [] Neurological  [] Endocrine  [] Heme/Lymph  [] Allergic/Immunologic    Explanation:     MEDICATIONS:    Current Facility-Administered Medications:     apixaban (ELIQUIS) tablet 5 mg, 5 mg, Oral, BID, Maryln Ok, APRN - CNP, 5 mg at 07/02/18 0920    carvedilol (COREG) tablet 6.25 mg, 6.25 mg, Oral, BID WC, Maryln Ok, APRN - CNP, 6.25 mg at 07/02/18 0920    torsemide (DEMADEX) tablet 20 mg, 20 mg, Oral, Daily, Maryln Ok, APRN - CNP, 20 mg at 07/02/18 7683    potassium chloride (KLOR-CON M) extended release tablet 10 mEq, 10 mEq, Oral, BID, Maryln Ok, APRN - CNP, 10 mEq at 07/02/18 4200    isosorbide dinitrate (ISORDIL) tablet 20 mg, 20 mg, Oral, TID, Maryln Ok, APRN - CNP, 20 mg at 07/02/18 1457    sertraline (ZOLOFT) tablet 150 mg, 150 mg, Oral, Daily, Andrés Urbano MD, 150 mg at 07/02/18 0919    [COMPLETED] paliperidone palmitate (INVEGA SUSTENNA) IM injection 234 mg, 234 mg, Intramuscular, Once, 234 mg at 06/28/18 1729 **FOLLOWED BY** [START ON 7/5/2018] paliperidone palmitate (INVEGA SUSTENNA) IM injection 156 mg, 156 mg, Intramuscular, Once **FOLLOWED BY** [START ON 7/26/2018] paliperidone palmitate (INVEGA SUSTENNA) IM injection 156 mg, 156 mg, Intramuscular, Q30 Days, Harpreet John MD    paliperidone (INVEGA) extended release tablet 9 mg, 9 mg, Oral, Daily, Harpreet John MD, 9 mg at 07/02/18 0920    benztropine (COGENTIN) tablet 2 mg, 2 mg, Oral, BID PRN **OR** benztropine mesylate (COGENTIN) injection 2 mg, 2 mg, Intramuscular, BID PRN, Harpreet John MD    hydrALAZINE (APRESOLINE) tablet 25 mg, 25 mg, Oral, 3 times per day, OCTAVIO Irving - CNP, 25 mg at

## 2018-11-26 NOTE — PROGRESS NOTES
Addended by: Radha Wiley on: 11/26/2018 04:43 PM     Modules accepted: Orders Pt noted in his room all shift, pt was encouraged out of his room, pt stated this is normal for him and stated he stays to him self at home, he is a home body not a anti social. Denied all suicidal thoughts, reports voices of hearing his family , they are less intense refused offer for prn haldol, explained in mena sustena injection and pt is agreeable, but stated it is for tomorrow, if he is not going home today he would take it today, informed pt its not here yet and should have it today when comes available,  informed its ordered for today, then pt stated he would take it today. Pt missed both meals, stated he is just not hungry. encouraged out of room for groups if even  for short time, reminded pt no one is in the group room this am and pt still refused to eat breakfast  in the group room or in his room. Agreeable to ensure.